# Patient Record
Sex: MALE | Race: WHITE | NOT HISPANIC OR LATINO | Employment: UNEMPLOYED | URBAN - METROPOLITAN AREA
[De-identification: names, ages, dates, MRNs, and addresses within clinical notes are randomized per-mention and may not be internally consistent; named-entity substitution may affect disease eponyms.]

---

## 2018-01-12 ENCOUNTER — HISTORICAL (OUTPATIENT)
Dept: ADMINISTRATIVE | Facility: HOSPITAL | Age: 23
End: 2018-01-12

## 2018-01-12 ENCOUNTER — HISTORICAL (OUTPATIENT)
Dept: INTERNAL MEDICINE | Facility: CLINIC | Age: 23
End: 2018-01-12

## 2018-01-12 LAB
ABS NEUT (OLG): 1.89 X10(3)/MCL (ref 2.1–9.2)
ALBUMIN SERPL-MCNC: 4 GM/DL (ref 3.4–5)
ALBUMIN/GLOB SERPL: 1 RATIO (ref 1–2)
ALP SERPL-CCNC: 102 UNIT/L (ref 45–117)
ALT SERPL-CCNC: 21 UNIT/L (ref 12–78)
AMPHET UR QL SCN: NEGATIVE
APPEARANCE, UA: CLEAR
AST SERPL-CCNC: 20 UNIT/L (ref 15–37)
BACTERIA #/AREA URNS AUTO: ABNORMAL /[HPF]
BARBITURATE SCN PRESENT UR: NEGATIVE
BASOPHILS # BLD AUTO: 0.03 X10(3)/MCL
BASOPHILS NFR BLD AUTO: 1 % (ref 0–1)
BENZODIAZ UR QL SCN: NEGATIVE
BILIRUB SERPL-MCNC: 0.3 MG/DL (ref 0.2–1)
BILIRUB UR QL STRIP: NEGATIVE
BILIRUBIN DIRECT+TOT PNL SERPL-MCNC: 0.1 MG/DL
BILIRUBIN DIRECT+TOT PNL SERPL-MCNC: 0.2 MG/DL
BUN SERPL-MCNC: 17 MG/DL (ref 7–18)
CALCIUM SERPL-MCNC: 9.4 MG/DL (ref 8.5–10.1)
CANNABINOIDS UR QL SCN: NEGATIVE
CD3+CD4+ CELLS # SPEC: 93 UNIT/L (ref 589–1505)
CD3+CD4+ CELLS NFR BLD: 4.5 % (ref 31–59)
CHLORIDE SERPL-SCNC: 102 MMOL/L (ref 98–107)
CHOLEST SERPL-MCNC: 128 MG/DL
CHOLEST/HDLC SERPL: 3 {RATIO} (ref 0–5)
CO2 SERPL-SCNC: 28 MMOL/L (ref 21–32)
COCAINE UR QL SCN: NEGATIVE
COLOR UR: NORMAL
CREAT SERPL-MCNC: 0.6 MG/DL (ref 0.6–1.3)
DEPRECATED CALCIDIOL+CALCIFEROL SERPL-MC: 14.09 NG/ML (ref 30–80)
EOSINOPHIL # BLD AUTO: 0.05 10*3/UL
EOSINOPHIL NFR BLD AUTO: 1 % (ref 0–5)
ERYTHROCYTE [DISTWIDTH] IN BLOOD BY AUTOMATED COUNT: 12.4 % (ref 11.5–14.5)
GLOBULIN SER-MCNC: 4.7 GM/ML (ref 2.3–3.5)
GLUCOSE (UA): NORMAL
GLUCOSE CSF-MCNC: 49 MG/DL (ref 40–70)
GLUCOSE SERPL-MCNC: 92 MG/DL (ref 74–106)
GRAM STN SPEC: NORMAL
HAV AB SER QL IA: NONREACTIVE
HBV CORE AB SERPL QL IA: NONREACTIVE
HBV SURFACE AB SER-ACNC: <3.1 MIU/ML
HBV SURFACE AB SERPL IA-ACNC: NONREACTIVE M[IU]/ML
HBV SURFACE AG SERPL QL IA: NEGATIVE
HCT VFR BLD AUTO: 43.8 % (ref 40–51)
HCV AB SERPL QL IA: NONREACTIVE
HDLC SERPL-MCNC: 43 MG/DL
HGB BLD-MCNC: 15 GM/DL (ref 13.5–17.5)
HGB UR QL STRIP: NEGATIVE
HYALINE CASTS #/AREA URNS LPF: ABNORMAL /[LPF]
IMM GRANULOCYTES # BLD AUTO: 0.02 10*3/UL
IMM GRANULOCYTES NFR BLD AUTO: 0 %
KETONES UR QL STRIP: NEGATIVE
LDLC SERPL CALC-MCNC: 61 MG/DL (ref 0–130)
LEUKOCYTE ESTERASE UR QL STRIP: NEGATIVE
LYMPHOCYTES # BLD AUTO: 2.07 X10(3)/MCL
LYMPHOCYTES # BLD AUTO: 2064 UNIT/L (ref 1260–5520)
LYMPHOCYTES NFR BLD AUTO: 48 % (ref 15–40)
LYMPHOCYTES NFR LN MANUAL: 48 % (ref 28–48)
LYMPHOMA - T-CELL MARKERS SPEC-IMP: ABNORMAL
MCH RBC QN AUTO: 28.1 PG (ref 26–34)
MCHC RBC AUTO-ENTMCNC: 34.2 GM/DL (ref 31–37)
MCV RBC AUTO: 82 FL (ref 80–100)
MONOCYTES # BLD AUTO: 0.28 X10(3)/MCL
MONOCYTES NFR BLD AUTO: 6 % (ref 4–12)
NEUTROPHILS # BLD AUTO: 1.89 X10(3)/MCL
NEUTROPHILS NFR BLD AUTO: 43 X10(3)/MCL
NITRITE UR QL STRIP: NEGATIVE
OPIATES UR QL SCN: NEGATIVE
PCP UR QL: NEGATIVE
PH UR STRIP.AUTO: 7.5 [PH] (ref 5–8)
PH UR STRIP: 7.5 [PH] (ref 4.5–8)
PLATELET # BLD AUTO: 324 X10(3)/MCL (ref 130–400)
PMV BLD AUTO: 9.8 FL (ref 7.4–10.4)
POTASSIUM SERPL-SCNC: 4.4 MMOL/L (ref 3.5–5.1)
PROT CSF-MCNC: 41.7 MG/DL (ref 15–45)
PROT SERPL-MCNC: 8.7 GM/DL (ref 6.4–8.2)
PROT UR QL STRIP: NEGATIVE
RBC # BLD AUTO: 5.34 X10(6)/MCL (ref 4.5–5.9)
RBC #/AREA URNS AUTO: ABNORMAL /[HPF]
RPR SER QL: REACTIVE
SODIUM SERPL-SCNC: 138 MMOL/L (ref 136–145)
SP GR UR STRIP: 1.01 (ref 1–1.03)
SQUAMOUS #/AREA URNS LPF: ABNORMAL /[LPF]
T PALLIDUM AB SER QL: REACTIVE
T-SPOT.TB: NORMAL
TEMPERATURE, URINE (OHS): 25 DEGC (ref 20–25)
TRIGL SERPL-MCNC: 122 MG/DL
TSH SERPL-ACNC: 2.97 MIU/L (ref 0.36–3.74)
UROBILINOGEN UR STRIP-ACNC: NORMAL
VLDLC SERPL CALC-MCNC: 24 MG/DL
WBC # BLD AUTO: 4300 /MM3 (ref 4500–11500)
WBC # SPEC AUTO: 4.3 X10(3)/MCL (ref 4.5–11)
WBC #/AREA URNS AUTO: ABNORMAL /HPF

## 2018-01-15 ENCOUNTER — HISTORICAL (OUTPATIENT)
Dept: ADMINISTRATIVE | Facility: HOSPITAL | Age: 23
End: 2018-01-15

## 2018-01-17 LAB — FINAL CULTURE: NORMAL

## 2018-01-25 ENCOUNTER — HISTORICAL (OUTPATIENT)
Dept: ADMINISTRATIVE | Facility: HOSPITAL | Age: 23
End: 2018-01-25

## 2018-01-25 LAB
ALBUMIN SERPL-MCNC: 3.8 GM/DL (ref 3.4–5)
ALBUMIN/GLOB SERPL: 1 RATIO (ref 1–2)
ALP SERPL-CCNC: 95 UNIT/L (ref 45–117)
ALT SERPL-CCNC: 143 UNIT/L (ref 12–78)
AST SERPL-CCNC: 61 UNIT/L (ref 15–37)
BILIRUB SERPL-MCNC: 0.3 MG/DL (ref 0.2–1)
BILIRUBIN DIRECT+TOT PNL SERPL-MCNC: 0.1 MG/DL
BILIRUBIN DIRECT+TOT PNL SERPL-MCNC: 0.2 MG/DL
BUN SERPL-MCNC: 9 MG/DL (ref 7–18)
CALCIUM SERPL-MCNC: 8.8 MG/DL (ref 8.5–10.1)
CHLORIDE SERPL-SCNC: 104 MMOL/L (ref 98–107)
CO2 SERPL-SCNC: 29 MMOL/L (ref 21–32)
CREAT SERPL-MCNC: 0.6 MG/DL (ref 0.6–1.3)
DEPRECATED CALCIDIOL+CALCIFEROL SERPL-MC: 19.37 NG/ML (ref 30–80)
GLOBULIN SER-MCNC: 4.6 GM/ML (ref 2.3–3.5)
GLUCOSE SERPL-MCNC: 68 MG/DL (ref 74–106)
POTASSIUM SERPL-SCNC: 4 MMOL/L (ref 3.5–5.1)
PROT SERPL-MCNC: 8.4 GM/DL (ref 6.4–8.2)
SODIUM SERPL-SCNC: 141 MMOL/L (ref 136–145)

## 2018-03-08 ENCOUNTER — HISTORICAL (OUTPATIENT)
Dept: ADMINISTRATIVE | Facility: HOSPITAL | Age: 23
End: 2018-03-08

## 2018-03-08 LAB
ABS NEUT (OLG): 2.37 X10(3)/MCL (ref 2.1–9.2)
ABS NEUT (OLG): 2.39 X10(3)/MCL (ref 2.1–9.2)
ALBUMIN SERPL-MCNC: 4 GM/DL (ref 3.4–5)
ALBUMIN/GLOB SERPL: 1 RATIO (ref 1–2)
ALP SERPL-CCNC: 74 UNIT/L (ref 45–117)
ALT SERPL-CCNC: 17 UNIT/L (ref 12–78)
AST SERPL-CCNC: 12 UNIT/L (ref 15–37)
BASOPHILS # BLD AUTO: 0.01 X10(3)/MCL
BASOPHILS # BLD AUTO: 0.02 X10(3)/MCL
BASOPHILS NFR BLD AUTO: 0 %
BASOPHILS NFR BLD AUTO: 0 %
BILIRUB SERPL-MCNC: 0.3 MG/DL (ref 0.2–1)
BILIRUBIN DIRECT+TOT PNL SERPL-MCNC: <0.1 MG/DL
BILIRUBIN DIRECT+TOT PNL SERPL-MCNC: ABNORMAL MG/DL
BUN SERPL-MCNC: 13 MG/DL (ref 7–18)
CALCIUM SERPL-MCNC: 8.7 MG/DL (ref 8.5–10.1)
CD3+CD4+ CELLS # SPEC: 186 UNIT/L (ref 589–1505)
CD3+CD4+ CELLS NFR BLD: 8.3 % (ref 31–59)
CHLORIDE SERPL-SCNC: 105 MMOL/L (ref 98–107)
CO2 SERPL-SCNC: 30 MMOL/L (ref 21–32)
CREAT SERPL-MCNC: 0.9 MG/DL (ref 0.6–1.3)
DEPRECATED CALCIDIOL+CALCIFEROL SERPL-MC: 50.26 NG/ML (ref 30–80)
EOSINOPHIL # BLD AUTO: 0.06 10*3/UL
EOSINOPHIL # BLD AUTO: 0.06 10*3/UL
EOSINOPHIL NFR BLD AUTO: 1 %
EOSINOPHIL NFR BLD AUTO: 1 %
ERYTHROCYTE [DISTWIDTH] IN BLOOD BY AUTOMATED COUNT: 15.3 % (ref 11.5–14.5)
ERYTHROCYTE [DISTWIDTH] IN BLOOD BY AUTOMATED COUNT: 15.4 % (ref 11.5–14.5)
GLOBULIN SER-MCNC: 3.7 GM/ML (ref 2.3–3.5)
GLUCOSE SERPL-MCNC: 73 MG/DL (ref 74–106)
HCT VFR BLD AUTO: 42.8 % (ref 40–51)
HCT VFR BLD AUTO: 43.2 % (ref 40–51)
HGB BLD-MCNC: 14.7 GM/DL (ref 13.5–17.5)
HGB BLD-MCNC: 14.7 GM/DL (ref 13.5–17.5)
IMM GRANULOCYTES # BLD AUTO: 0.01 10*3/UL
IMM GRANULOCYTES # BLD AUTO: 0.01 10*3/UL
IMM GRANULOCYTES NFR BLD AUTO: 0 %
IMM GRANULOCYTES NFR BLD AUTO: 0 %
LYMPHOCYTES # BLD AUTO: 2.09 X10(3)/MCL
LYMPHOCYTES # BLD AUTO: 2.27 X10(3)/MCL
LYMPHOCYTES # BLD AUTO: 2244 UNIT/L (ref 1260–5520)
LYMPHOCYTES NFR BLD AUTO: 42 % (ref 13–40)
LYMPHOCYTES NFR BLD AUTO: 44 % (ref 13–40)
LYMPHOCYTES NFR LN MANUAL: 44 % (ref 28–48)
LYMPHOMA - T-CELL MARKERS SPEC-IMP: ABNORMAL
MCH RBC QN AUTO: 29.2 PG (ref 26–34)
MCH RBC QN AUTO: 29.5 PG (ref 26–34)
MCHC RBC AUTO-ENTMCNC: 34 GM/DL (ref 31–37)
MCHC RBC AUTO-ENTMCNC: 34.3 GM/DL (ref 31–37)
MCV RBC AUTO: 85.7 FL (ref 80–100)
MCV RBC AUTO: 85.8 FL (ref 80–100)
MONOCYTES # BLD AUTO: 0.38 X10(3)/MCL
MONOCYTES # BLD AUTO: 0.39 X10(3)/MCL
MONOCYTES NFR BLD AUTO: 8 % (ref 4–12)
MONOCYTES NFR BLD AUTO: 8 % (ref 4–12)
NEUTROPHILS # BLD AUTO: 2.37 X10(3)/MCL
NEUTROPHILS # BLD AUTO: 2.39 X10(3)/MCL
NEUTROPHILS NFR BLD AUTO: 46 X10(3)/MCL
NEUTROPHILS NFR BLD AUTO: 48 X10(3)/MCL
PLATELET # BLD AUTO: 272 X10(3)/MCL (ref 130–400)
PLATELET # BLD AUTO: 279 X10(3)/MCL (ref 130–400)
PMV BLD AUTO: 9.4 FL (ref 7.4–10.4)
PMV BLD AUTO: 9.4 FL (ref 7.4–10.4)
POTASSIUM SERPL-SCNC: 4.1 MMOL/L (ref 3.5–5.1)
PROT SERPL-MCNC: 7.7 GM/DL (ref 6.4–8.2)
RBC # BLD AUTO: 4.99 X10(6)/MCL (ref 4.5–5.9)
RBC # BLD AUTO: 5.04 X10(6)/MCL (ref 4.5–5.9)
RPR SER QL: REACTIVE
SODIUM SERPL-SCNC: 140 MMOL/L (ref 136–145)
T PALLIDUM AB SER QL: REACTIVE
WBC # BLD AUTO: 5100 /MM3 (ref 4500–11500)
WBC # SPEC AUTO: 5 X10(3)/MCL (ref 4.5–11)
WBC # SPEC AUTO: 5.1 X10(3)/MCL (ref 4.5–11)

## 2018-05-09 ENCOUNTER — HISTORICAL (OUTPATIENT)
Dept: ADMINISTRATIVE | Facility: HOSPITAL | Age: 23
End: 2018-05-09

## 2018-05-09 LAB
ABS NEUT (OLG): 3.03 X10(3)/MCL (ref 2.1–9.2)
ALBUMIN SERPL-MCNC: 4.2 GM/DL (ref 3.4–5)
ALBUMIN/GLOB SERPL: 1 RATIO (ref 1–2)
ALP SERPL-CCNC: 70 UNIT/L (ref 45–117)
ALT SERPL-CCNC: 20 UNIT/L (ref 12–78)
AST SERPL-CCNC: 15 UNIT/L (ref 15–37)
BASOPHILS # BLD AUTO: 0.03 X10(3)/MCL
BASOPHILS NFR BLD AUTO: 0 %
BILIRUB SERPL-MCNC: 0.5 MG/DL (ref 0.2–1)
BILIRUBIN DIRECT+TOT PNL SERPL-MCNC: 0.2 MG/DL
BILIRUBIN DIRECT+TOT PNL SERPL-MCNC: 0.3 MG/DL
BUN SERPL-MCNC: 13 MG/DL (ref 7–18)
CALCIUM SERPL-MCNC: 8.4 MG/DL (ref 8.5–10.1)
CD3+CD4+ CELLS # SPEC: 194 UNIT/L (ref 589–1505)
CD3+CD4+ CELLS NFR BLD: 10.1 % (ref 31–59)
CHLORIDE SERPL-SCNC: 107 MMOL/L (ref 98–107)
CO2 SERPL-SCNC: 27 MMOL/L (ref 21–32)
CREAT SERPL-MCNC: 0.7 MG/DL (ref 0.6–1.3)
DEPRECATED CALCIDIOL+CALCIFEROL SERPL-MC: 85.65 NG/ML (ref 30–80)
EOSINOPHIL # BLD AUTO: 0.11 X10(3)/MCL
EOSINOPHIL NFR BLD AUTO: 2 %
ERYTHROCYTE [DISTWIDTH] IN BLOOD BY AUTOMATED COUNT: 12.8 % (ref 11.5–14.5)
GLOBULIN SER-MCNC: 3.5 GM/ML (ref 2.3–3.5)
GLUCOSE SERPL-MCNC: 86 MG/DL (ref 74–106)
HCT VFR BLD AUTO: 44.9 % (ref 40–51)
HGB BLD-MCNC: 15.6 GM/DL (ref 13.5–17.5)
IMM GRANULOCYTES # BLD AUTO: 0.02 10*3/UL
IMM GRANULOCYTES NFR BLD AUTO: 0 %
LYMPHOCYTES # BLD AUTO: 1.93 X10(3)/MCL
LYMPHOCYTES # BLD AUTO: 1925 UNIT/L (ref 1260–5520)
LYMPHOCYTES NFR BLD AUTO: 35 % (ref 13–40)
LYMPHOCYTES NFR LN MANUAL: 35 % (ref 28–48)
LYMPHOMA - T-CELL MARKERS SPEC-IMP: ABNORMAL
MCH RBC QN AUTO: 30.4 PG (ref 26–34)
MCHC RBC AUTO-ENTMCNC: 34.7 GM/DL (ref 31–37)
MCV RBC AUTO: 87.5 FL (ref 80–100)
MONOCYTES # BLD AUTO: 0.42 X10(3)/MCL
MONOCYTES NFR BLD AUTO: 8 % (ref 4–12)
NEUTROPHILS # BLD AUTO: 3.03 X10(3)/MCL
NEUTROPHILS NFR BLD AUTO: 55 X10(3)/MCL
PLATELET # BLD AUTO: 260 X10(3)/MCL (ref 130–400)
PMV BLD AUTO: 9.3 FL (ref 7.4–10.4)
POTASSIUM SERPL-SCNC: 3.6 MMOL/L (ref 3.5–5.1)
PROT SERPL-MCNC: 7.7 GM/DL (ref 6.4–8.2)
RBC # BLD AUTO: 5.13 X10(6)/MCL (ref 4.5–5.9)
RPR SER QL: REACTIVE
SODIUM SERPL-SCNC: 139 MMOL/L (ref 136–145)
T PALLIDUM AB SER QL: REACTIVE
WBC # BLD AUTO: 5500 /MM3 (ref 4500–11500)
WBC # SPEC AUTO: 5.5 X10(3)/MCL (ref 4.5–11)

## 2018-07-11 ENCOUNTER — HISTORICAL (OUTPATIENT)
Dept: ADMINISTRATIVE | Facility: HOSPITAL | Age: 23
End: 2018-07-11

## 2018-07-11 LAB
ABS NEUT (OLG): 3.33 X10(3)/MCL (ref 2.1–9.2)
ALBUMIN SERPL-MCNC: 4.3 GM/DL (ref 3.4–5)
ALBUMIN/GLOB SERPL: 1 RATIO (ref 1–2)
ALP SERPL-CCNC: 76 UNIT/L (ref 45–117)
ALT SERPL-CCNC: 22 UNIT/L (ref 12–78)
AST SERPL-CCNC: 14 UNIT/L (ref 15–37)
BASOPHILS # BLD AUTO: 0.02 X10(3)/MCL
BASOPHILS NFR BLD AUTO: 0 %
BILIRUB SERPL-MCNC: 0.3 MG/DL (ref 0.2–1)
BILIRUBIN DIRECT+TOT PNL SERPL-MCNC: 0.1 MG/DL
BILIRUBIN DIRECT+TOT PNL SERPL-MCNC: 0.2 MG/DL
BUN SERPL-MCNC: 17 MG/DL (ref 7–18)
CALCIUM SERPL-MCNC: 8.5 MG/DL (ref 8.5–10.1)
CD3+CD4+ CELLS # SPEC: 217 UNIT/L (ref 589–1505)
CD3+CD4+ CELLS NFR BLD: 12.5 % (ref 31–59)
CHLORIDE SERPL-SCNC: 105 MMOL/L (ref 98–107)
CO2 SERPL-SCNC: 29 MMOL/L (ref 21–32)
CREAT SERPL-MCNC: 1.1 MG/DL (ref 0.6–1.3)
EOSINOPHIL # BLD AUTO: 0.09 X10(3)/MCL
EOSINOPHIL NFR BLD AUTO: 2 %
ERYTHROCYTE [DISTWIDTH] IN BLOOD BY AUTOMATED COUNT: 12.4 % (ref 11.5–14.5)
GLOBULIN SER-MCNC: 3.4 GM/ML (ref 2.3–3.5)
GLUCOSE SERPL-MCNC: 81 MG/DL (ref 74–106)
HCT VFR BLD AUTO: 44.8 % (ref 40–51)
HGB BLD-MCNC: 15.5 GM/DL (ref 13.5–17.5)
IMM GRANULOCYTES # BLD AUTO: 0.01 10*3/UL
IMM GRANULOCYTES NFR BLD AUTO: 0 %
LYMPHOCYTES # BLD AUTO: 1.75 X10(3)/MCL
LYMPHOCYTES # BLD AUTO: 1736 UNIT/L (ref 1260–5520)
LYMPHOCYTES NFR BLD AUTO: 31 % (ref 13–40)
LYMPHOCYTES NFR LN MANUAL: 31 % (ref 28–48)
LYMPHOMA - T-CELL MARKERS SPEC-IMP: ABNORMAL
MCH RBC QN AUTO: 31.4 PG (ref 26–34)
MCHC RBC AUTO-ENTMCNC: 34.6 GM/DL (ref 31–37)
MCV RBC AUTO: 90.7 FL (ref 80–100)
MONOCYTES # BLD AUTO: 0.42 X10(3)/MCL
MONOCYTES NFR BLD AUTO: 8 % (ref 4–12)
NEUTROPHILS # BLD AUTO: 3.33 X10(3)/MCL
NEUTROPHILS NFR BLD AUTO: 59 X10(3)/MCL
PLATELET # BLD AUTO: 256 X10(3)/MCL (ref 130–400)
PMV BLD AUTO: 10 FL (ref 7.4–10.4)
POTASSIUM SERPL-SCNC: 3.9 MMOL/L (ref 3.5–5.1)
PROT SERPL-MCNC: 7.7 GM/DL (ref 6.4–8.2)
RBC # BLD AUTO: 4.94 X10(6)/MCL (ref 4.5–5.9)
SODIUM SERPL-SCNC: 141 MMOL/L (ref 136–145)
WBC # BLD AUTO: 5600 /MM3 (ref 4500–11500)
WBC # SPEC AUTO: 5.6 X10(3)/MCL (ref 4.5–11)

## 2018-10-17 ENCOUNTER — HISTORICAL (OUTPATIENT)
Dept: ADMINISTRATIVE | Facility: HOSPITAL | Age: 23
End: 2018-10-17

## 2018-10-17 LAB
ABS NEUT (OLG): 3.15 X10(3)/MCL (ref 2.1–9.2)
ALBUMIN SERPL-MCNC: 4 GM/DL (ref 3.4–5)
ALBUMIN/GLOB SERPL: 1 RATIO (ref 1–2)
ALP SERPL-CCNC: 88 UNIT/L (ref 45–117)
ALT SERPL-CCNC: 20 UNIT/L (ref 12–78)
AST SERPL-CCNC: 13 UNIT/L (ref 15–37)
BASOPHILS # BLD AUTO: 0.02 X10(3)/MCL
BASOPHILS NFR BLD AUTO: 0 %
BILIRUB SERPL-MCNC: 0.4 MG/DL (ref 0.2–1)
BILIRUBIN DIRECT+TOT PNL SERPL-MCNC: 0.1 MG/DL
BILIRUBIN DIRECT+TOT PNL SERPL-MCNC: 0.3 MG/DL
BUN SERPL-MCNC: 13 MG/DL (ref 7–18)
CALCIUM SERPL-MCNC: 8.7 MG/DL (ref 8.5–10.1)
CD3+CD4+ CELLS # SPEC: 275 UNIT/L (ref 589–1505)
CD3+CD4+ CELLS NFR BLD: 19.2 % (ref 31–59)
CHLORIDE SERPL-SCNC: 102 MMOL/L (ref 98–107)
CO2 SERPL-SCNC: 31 MMOL/L (ref 21–32)
CREAT SERPL-MCNC: 0.8 MG/DL (ref 0.6–1.3)
DEPRECATED CALCIDIOL+CALCIFEROL SERPL-MC: 57.96 NG/ML (ref 30–80)
EOSINOPHIL # BLD AUTO: 0.04 10*3/UL
EOSINOPHIL NFR BLD AUTO: 1 %
ERYTHROCYTE [DISTWIDTH] IN BLOOD BY AUTOMATED COUNT: 11.8 % (ref 11.5–14.5)
GLOBULIN SER-MCNC: 3.6 GM/ML (ref 2.3–3.5)
GLUCOSE SERPL-MCNC: 96 MG/DL (ref 74–106)
HAV IGM SERPL QL IA: NONREACTIVE
HBV CORE IGM SERPL QL IA: NONREACTIVE
HBV SURFACE AG SERPL QL IA: NEGATIVE
HCT VFR BLD AUTO: 45.7 % (ref 40–51)
HCV AB SERPL QL IA: NONREACTIVE
HGB BLD-MCNC: 15.5 GM/DL (ref 13.5–17.5)
IMM GRANULOCYTES # BLD AUTO: 0.02 10*3/UL
IMM GRANULOCYTES NFR BLD AUTO: 0 %
LYMPHOCYTES # BLD AUTO: 1.41 X10(3)/MCL
LYMPHOCYTES # BLD AUTO: 1431 UNIT/L (ref 1260–5520)
LYMPHOCYTES NFR BLD AUTO: 27 % (ref 13–40)
LYMPHOCYTES NFR LN MANUAL: 27 % (ref 28–48)
LYMPHOMA - T-CELL MARKERS SPEC-IMP: ABNORMAL
MCH RBC QN AUTO: 30.7 PG (ref 26–34)
MCHC RBC AUTO-ENTMCNC: 33.9 GM/DL (ref 31–37)
MCV RBC AUTO: 90.5 FL (ref 80–100)
MONOCYTES # BLD AUTO: 0.67 X10(3)/MCL
MONOCYTES NFR BLD AUTO: 13 % (ref 4–12)
NEUTROPHILS # BLD AUTO: 3.15 X10(3)/MCL
NEUTROPHILS NFR BLD AUTO: 59 X10(3)/MCL
PLATELET # BLD AUTO: 214 X10(3)/MCL (ref 130–400)
PMV BLD AUTO: 10.1 FL (ref 7.4–10.4)
POTASSIUM SERPL-SCNC: 3.9 MMOL/L (ref 3.5–5.1)
PROT SERPL-MCNC: 7.6 GM/DL (ref 6.4–8.2)
RBC # BLD AUTO: 5.05 X10(6)/MCL (ref 4.5–5.9)
SODIUM SERPL-SCNC: 137 MMOL/L (ref 136–145)
WBC # BLD AUTO: 5300 /MM3 (ref 4500–11500)
WBC # SPEC AUTO: 5.3 X10(3)/MCL (ref 4.5–11)

## 2019-02-06 ENCOUNTER — HISTORICAL (OUTPATIENT)
Dept: ADMINISTRATIVE | Facility: HOSPITAL | Age: 24
End: 2019-02-06

## 2019-02-06 LAB
ABS NEUT (OLG): 3.09 X10(3)/MCL (ref 2.1–9.2)
ALBUMIN SERPL-MCNC: 4.2 GM/DL (ref 3.4–5)
ALBUMIN/GLOB SERPL: 1.3 RATIO (ref 1.1–2)
ALP SERPL-CCNC: 66 UNIT/L (ref 45–117)
ALT SERPL-CCNC: 14 UNIT/L (ref 12–78)
AST SERPL-CCNC: 8 UNIT/L (ref 15–37)
BASOPHILS # BLD AUTO: 0.03 X10(3)/MCL
BASOPHILS NFR BLD AUTO: 0 %
BILIRUB SERPL-MCNC: 0.4 MG/DL (ref 0.2–1)
BILIRUBIN DIRECT+TOT PNL SERPL-MCNC: 0.1 MG/DL
BILIRUBIN DIRECT+TOT PNL SERPL-MCNC: 0.3 MG/DL
BUN SERPL-MCNC: 12 MG/DL (ref 7–18)
CALCIUM SERPL-MCNC: 8.4 MG/DL (ref 8.5–10.1)
CD3+CD4+ CELLS # SPEC: 333 UNIT/L (ref 589–1505)
CD3+CD4+ CELLS NFR BLD: 17.5 % (ref 31–59)
CHLORIDE SERPL-SCNC: 108 MMOL/L (ref 98–107)
CO2 SERPL-SCNC: 29 MMOL/L (ref 21–32)
CREAT SERPL-MCNC: 0.9 MG/DL (ref 0.6–1.3)
DEPRECATED CALCIDIOL+CALCIFEROL SERPL-MC: 51.44 NG/ML (ref 30–80)
EOSINOPHIL # BLD AUTO: 0.13 X10(3)/MCL
EOSINOPHIL NFR BLD AUTO: 2 %
ERYTHROCYTE [DISTWIDTH] IN BLOOD BY AUTOMATED COUNT: 12.7 % (ref 11.5–14.5)
GLOBULIN SER-MCNC: 3.2 GM/ML (ref 2.3–3.5)
GLUCOSE SERPL-MCNC: 97 MG/DL (ref 74–106)
HCT VFR BLD AUTO: 44 % (ref 40–51)
HGB BLD-MCNC: 15 GM/DL (ref 13.5–17.5)
IMM GRANULOCYTES # BLD AUTO: 0.01 10*3/UL
IMM GRANULOCYTES NFR BLD AUTO: 0 %
LYMPHOCYTES # BLD AUTO: 1.92 X10(3)/MCL
LYMPHOCYTES # BLD AUTO: 1904 UNIT/L (ref 1260–5520)
LYMPHOCYTES NFR BLD AUTO: 34 % (ref 13–40)
LYMPHOCYTES NFR LN MANUAL: 34 % (ref 28–48)
LYMPHOMA - T-CELL MARKERS SPEC-IMP: ABNORMAL
MCH RBC QN AUTO: 30 PG (ref 26–34)
MCHC RBC AUTO-ENTMCNC: 34.1 GM/DL (ref 31–37)
MCV RBC AUTO: 88 FL (ref 80–100)
MONOCYTES # BLD AUTO: 0.42 X10(3)/MCL
MONOCYTES NFR BLD AUTO: 8 % (ref 4–12)
NEG CONT SPOT COUNT: NORMAL
NEUTROPHILS # BLD AUTO: 3.09 X10(3)/MCL
NEUTROPHILS NFR BLD AUTO: 55 X10(3)/MCL
PANEL A SPOT COUNT: 1
PANEL B SPOT COUNT: 0
PLATELET # BLD AUTO: 269 X10(3)/MCL (ref 130–400)
PMV BLD AUTO: 9.7 FL (ref 7.4–10.4)
POS CONT SPOT COUNT: NORMAL
POTASSIUM SERPL-SCNC: 3.8 MMOL/L (ref 3.5–5.1)
PROT SERPL-MCNC: 7.4 GM/DL (ref 6.4–8.2)
RBC # BLD AUTO: 5 X10(6)/MCL (ref 4.5–5.9)
RPR SER QL: REACTIVE
SODIUM SERPL-SCNC: 137 MMOL/L (ref 136–145)
T PALLIDUM AB SER QL: REACTIVE
T-SPOT.TB: NORMAL
WBC # BLD AUTO: 5600 /MM3 (ref 4500–11500)
WBC # SPEC AUTO: 5.6 X10(3)/MCL (ref 4.5–11)

## 2019-07-24 ENCOUNTER — HISTORICAL (OUTPATIENT)
Dept: ADMINISTRATIVE | Facility: HOSPITAL | Age: 24
End: 2019-07-24

## 2019-07-24 LAB
ABS NEUT (OLG): 3.42 X10(3)/MCL (ref 2.1–9.2)
ALBUMIN SERPL-MCNC: 4.3 GM/DL (ref 3.4–5)
ALBUMIN/GLOB SERPL: 1.3 RATIO (ref 1.1–2)
ALP SERPL-CCNC: 77 UNIT/L (ref 45–117)
ALT SERPL-CCNC: 19 UNIT/L (ref 12–78)
APPEARANCE, UA: CLEAR
AST SERPL-CCNC: 10 UNIT/L (ref 15–37)
BACTERIA #/AREA URNS AUTO: ABNORMAL /[HPF]
BASOPHILS # BLD AUTO: 0.03 X10(3)/MCL
BASOPHILS NFR BLD AUTO: 0 %
BILIRUB SERPL-MCNC: 0.4 MG/DL (ref 0.2–1)
BILIRUB UR QL STRIP: NEGATIVE
BILIRUBIN DIRECT+TOT PNL SERPL-MCNC: 0.2 MG/DL
BILIRUBIN DIRECT+TOT PNL SERPL-MCNC: 0.2 MG/DL
BUN SERPL-MCNC: 15 MG/DL (ref 7–18)
CALCIUM SERPL-MCNC: 8.9 MG/DL (ref 8.5–10.1)
CD3+CD4+ CELLS # SPEC: 288 UNIT/L (ref 589–1505)
CD3+CD4+ CELLS NFR BLD: 18.7 % (ref 31–59)
CHLORIDE SERPL-SCNC: 107 MMOL/L (ref 98–107)
CHOLEST SERPL-MCNC: 182 MG/DL
CHOLEST/HDLC SERPL: 4 {RATIO} (ref 0–5)
CO2 SERPL-SCNC: 28 MMOL/L (ref 21–32)
COLOR UR: ABNORMAL
CREAT SERPL-MCNC: 0.8 MG/DL (ref 0.6–1.3)
DEPRECATED CALCIDIOL+CALCIFEROL SERPL-MC: 30.7 NG/ML (ref 30–80)
EOSINOPHIL # BLD AUTO: 0.08 10*3/UL
EOSINOPHIL NFR BLD AUTO: 2 %
ERYTHROCYTE [DISTWIDTH] IN BLOOD BY AUTOMATED COUNT: 12.6 % (ref 11.5–14.5)
GLOBULIN SER-MCNC: 3.4 GM/ML (ref 2.3–3.5)
GLUCOSE (UA): NORMAL
GLUCOSE SERPL-MCNC: 89 MG/DL (ref 74–106)
HAV IGM SERPL QL IA: NONREACTIVE
HBV CORE IGM SERPL QL IA: NONREACTIVE
HBV SURFACE AG SERPL QL IA: NEGATIVE
HCT VFR BLD AUTO: 46.2 % (ref 40–51)
HCV AB SERPL QL IA: NONREACTIVE
HDLC SERPL-MCNC: 46 MG/DL
HGB BLD-MCNC: 15.5 GM/DL (ref 13.5–17.5)
HGB UR QL STRIP: NEGATIVE
HYALINE CASTS #/AREA URNS LPF: ABNORMAL /[LPF]
IMM GRANULOCYTES # BLD AUTO: 0.02 10*3/UL
IMM GRANULOCYTES NFR BLD AUTO: 0 %
KETONES UR QL STRIP: NEGATIVE
LDLC SERPL CALC-MCNC: 123 MG/DL (ref 0–130)
LEUKOCYTE ESTERASE UR QL STRIP: NEGATIVE
LYMPHOCYTES # BLD AUTO: 1.54 X10(3)/MCL
LYMPHOCYTES # BLD AUTO: 1540 UNIT/L (ref 1260–5520)
LYMPHOCYTES NFR BLD AUTO: 28 % (ref 13–40)
LYMPHOCYTES NFR LN MANUAL: 28 % (ref 28–48)
LYMPHOMA - T-CELL MARKERS SPEC-IMP: ABNORMAL
MCH RBC QN AUTO: 29.5 PG (ref 26–34)
MCHC RBC AUTO-ENTMCNC: 33.5 GM/DL (ref 31–37)
MCV RBC AUTO: 87.8 FL (ref 80–100)
MONOCYTES # BLD AUTO: 0.42 X10(3)/MCL
MONOCYTES NFR BLD AUTO: 8 % (ref 0–24)
NEUTROPHILS # BLD AUTO: 3.42 X10(3)/MCL
NEUTROPHILS NFR BLD AUTO: 62 X10(3)/MCL
NITRITE UR QL STRIP: NEGATIVE
PH UR STRIP: 6.5 [PH] (ref 4.5–8)
PLATELET # BLD AUTO: 272 X10(3)/MCL (ref 130–400)
PMV BLD AUTO: 10 FL (ref 7.4–10.4)
POTASSIUM SERPL-SCNC: 3.8 MMOL/L (ref 3.5–5.1)
PROT SERPL-MCNC: 7.7 GM/DL (ref 6.4–8.2)
PROT UR QL STRIP: NEGATIVE
RBC # BLD AUTO: 5.26 X10(6)/MCL (ref 4.5–5.9)
RBC #/AREA URNS AUTO: ABNORMAL /[HPF]
RPR SER QL: REACTIVE
SODIUM SERPL-SCNC: 140 MMOL/L (ref 136–145)
SP GR UR STRIP: 1.02 (ref 1–1.03)
SQUAMOUS #/AREA URNS LPF: <1 /[LPF]
T PALLIDUM AB SER QL: REACTIVE
TRIGL SERPL-MCNC: 66 MG/DL
TSH SERPL-ACNC: 3.18 MIU/L (ref 0.36–3.74)
UROBILINOGEN UR STRIP-ACNC: 2 MG/DL
VLDLC SERPL CALC-MCNC: 13 MG/DL
WBC # BLD AUTO: 5500 /MM3 (ref 4500–11500)
WBC # SPEC AUTO: 5.5 X10(3)/MCL (ref 4.5–11)
WBC #/AREA URNS AUTO: ABNORMAL /HPF

## 2019-10-28 ENCOUNTER — HISTORICAL (OUTPATIENT)
Dept: ADMINISTRATIVE | Facility: HOSPITAL | Age: 24
End: 2019-10-28

## 2019-10-28 LAB
ABS NEUT (OLG): 4.2 X10(3)/MCL (ref 2.1–9.2)
ALBUMIN SERPL-MCNC: 4 GM/DL (ref 3.4–5)
ALBUMIN/GLOB SERPL: 1.2 RATIO (ref 1.1–2)
ALP SERPL-CCNC: 73 UNIT/L (ref 45–117)
ALT SERPL-CCNC: 13 UNIT/L (ref 12–78)
AST SERPL-CCNC: 8 UNIT/L (ref 15–37)
BASOPHILS # BLD AUTO: 0 X10(3)/MCL (ref 0–0.2)
BASOPHILS NFR BLD AUTO: 1 %
BILIRUB SERPL-MCNC: 0.5 MG/DL (ref 0.2–1)
BILIRUBIN DIRECT+TOT PNL SERPL-MCNC: 0.1 MG/DL (ref 0–0.2)
BILIRUBIN DIRECT+TOT PNL SERPL-MCNC: 0.4 MG/DL
BUN SERPL-MCNC: 14 MG/DL (ref 7–18)
CALCIUM SERPL-MCNC: 8.6 MG/DL (ref 8.5–10.1)
CD3+CD4+ CELLS # SPEC: 363 UNIT/L (ref 589–1505)
CD3+CD4+ CELLS NFR BLD: 21.5 % (ref 31–59)
CHLORIDE SERPL-SCNC: 109 MMOL/L (ref 98–107)
CO2 SERPL-SCNC: 28 MMOL/L (ref 21–32)
CREAT SERPL-MCNC: 0.8 MG/DL (ref 0.6–1.3)
DEPRECATED CALCIDIOL+CALCIFEROL SERPL-MC: 35.12 NG/ML (ref 30–80)
EOSINOPHIL # BLD AUTO: 0.1 X10(3)/MCL (ref 0–0.9)
EOSINOPHIL NFR BLD AUTO: 2 %
ERYTHROCYTE [DISTWIDTH] IN BLOOD BY AUTOMATED COUNT: 12.4 % (ref 11.5–14.5)
GLOBULIN SER-MCNC: 3.4 GM/ML (ref 2.3–3.5)
GLUCOSE SERPL-MCNC: 92 MG/DL (ref 74–106)
HCT VFR BLD AUTO: 48.1 % (ref 40–51)
HGB BLD-MCNC: 16.4 GM/DL (ref 13.5–17.5)
IMM GRANULOCYTES # BLD AUTO: 0.02 10*3/UL
IMM GRANULOCYTES NFR BLD AUTO: 0 %
LYMPHOCYTES # BLD AUTO: 1.7 X10(3)/MCL (ref 0.6–4.6)
LYMPHOCYTES # BLD AUTO: 1690 UNIT/L (ref 1260–5520)
LYMPHOCYTES NFR BLD AUTO: 26 %
LYMPHOCYTES NFR LN MANUAL: 26 % (ref 28–48)
LYMPHOMA - T-CELL MARKERS SPEC-IMP: ABNORMAL
MCH RBC QN AUTO: 30.9 PG (ref 26–34)
MCHC RBC AUTO-ENTMCNC: 34.1 GM/DL (ref 31–37)
MCV RBC AUTO: 90.6 FL (ref 80–100)
MONOCYTES # BLD AUTO: 0.4 X10(3)/MCL (ref 0.1–1.3)
MONOCYTES NFR BLD AUTO: 7 %
NEUTROPHILS # BLD AUTO: 4.2 X10(3)/MCL (ref 2.1–9.2)
NEUTROPHILS NFR BLD AUTO: 65 %
PLATELET # BLD AUTO: 256 X10(3)/MCL (ref 130–400)
PMV BLD AUTO: 9.9 FL (ref 7.4–10.4)
POTASSIUM SERPL-SCNC: 4 MMOL/L (ref 3.5–5.1)
PROT SERPL-MCNC: 7.4 GM/DL (ref 6.4–8.2)
RBC # BLD AUTO: 5.31 X10(6)/MCL (ref 4.5–5.9)
RPR SER QL: REACTIVE
SODIUM SERPL-SCNC: 139 MMOL/L (ref 136–145)
T PALLIDUM AB SER QL: REACTIVE
WBC # BLD AUTO: 6500 /MM3 (ref 4500–11500)
WBC # SPEC AUTO: 6.5 X10(3)/MCL (ref 4.5–11)

## 2021-04-13 LAB
ABS NEUT (OLG): 3.2 X10(3)/MCL (ref 1.5–6.9)
ALBUMIN SERPL-MCNC: 3.9 GM/DL (ref 3.5–5)
ALBUMIN/GLOB SERPL: 1 RATIO (ref 1.1–2)
ALP SERPL-CCNC: 71 UNIT/L (ref 40–150)
ALT SERPL-CCNC: 10 UNIT/L (ref 0–55)
APTT PPP: 37.2 SEC (ref 23.4–34.9)
AST SERPL-CCNC: 15 UNIT/L (ref 5–34)
BASOPHILS # BLD AUTO: 0 X10(3)/MCL (ref 0–0.1)
BASOPHILS NFR BLD AUTO: 1 % (ref 0–1)
BILIRUB SERPL-MCNC: 0.3 MG/DL
BILIRUBIN DIRECT+TOT PNL SERPL-MCNC: 0.1 MG/DL (ref 0–0.5)
BILIRUBIN DIRECT+TOT PNL SERPL-MCNC: 0.2 MG/DL (ref 0–0.8)
BUN SERPL-MCNC: 7 MG/DL (ref 8.9–20.6)
CALCIUM SERPL-MCNC: 9.1 MG/DL (ref 8.4–10.2)
CHLORIDE SERPL-SCNC: 104 MMOL/L (ref 98–107)
CO2 SERPL-SCNC: 28 MMOL/L (ref 22–29)
CREAT SERPL-MCNC: 0.89 MG/DL (ref 0.73–1.18)
EOSINOPHIL # BLD AUTO: 0.2 X10(3)/MCL (ref 0–0.6)
EOSINOPHIL NFR BLD AUTO: 4 % (ref 0–5)
ERYTHROCYTE [DISTWIDTH] IN BLOOD BY AUTOMATED COUNT: 13.9 % (ref 11.5–17)
GLOBULIN SER-MCNC: 3.8 GM/DL (ref 2.4–3.5)
GLUCOSE SERPL-MCNC: 104 MG/DL (ref 74–100)
HCT VFR BLD AUTO: 39.1 % (ref 42–52)
HGB BLD-MCNC: 13 GM/DL (ref 14–18)
IMM GRANULOCYTES # BLD AUTO: 0.01 10*3/UL (ref 0–0.02)
IMM GRANULOCYTES NFR BLD AUTO: 0.2 % (ref 0–0.43)
INR PPP: 1 (ref 2–3)
LYMPHOCYTES # BLD AUTO: 1.6 X10(3)/MCL (ref 0.5–4.1)
LYMPHOCYTES NFR BLD AUTO: 30 % (ref 15–40)
MCH RBC QN AUTO: 31 PG (ref 27–34)
MCHC RBC AUTO-ENTMCNC: 33 GM/DL (ref 31–36)
MCV RBC AUTO: 92 FL (ref 80–99)
MONOCYTES # BLD AUTO: 0.3 X10(3)/MCL (ref 0–1.1)
MONOCYTES NFR BLD AUTO: 6 % (ref 4–12)
NEUTROPHILS # BLD AUTO: 3.2 X10(3)/MCL (ref 1.5–6.9)
NEUTROPHILS NFR BLD AUTO: 60 % (ref 43–75)
PLATELET # BLD AUTO: 300 X10(3)/MCL (ref 140–400)
PMV BLD AUTO: 9.3 FL (ref 6.8–10)
POTASSIUM SERPL-SCNC: 3.8 MMOL/L (ref 3.5–5.1)
PROT SERPL-MCNC: 7.7 GM/DL (ref 6.4–8.3)
PROTHROMBIN TIME: 12.9 SEC (ref 11.7–14.5)
RBC # BLD AUTO: 4.23 X10(6)/MCL (ref 4.7–6.1)
SODIUM SERPL-SCNC: 137 MMOL/L (ref 136–145)
WBC # SPEC AUTO: 5.4 X10(3)/MCL (ref 4.5–11.5)

## 2021-04-19 ENCOUNTER — HISTORICAL (OUTPATIENT)
Dept: ANESTHESIOLOGY | Facility: HOSPITAL | Age: 26
End: 2021-04-19

## 2022-04-10 ENCOUNTER — HISTORICAL (OUTPATIENT)
Dept: ADMINISTRATIVE | Facility: HOSPITAL | Age: 27
End: 2022-04-10

## 2022-04-24 VITALS
DIASTOLIC BLOOD PRESSURE: 84 MMHG | SYSTOLIC BLOOD PRESSURE: 127 MMHG | OXYGEN SATURATION: 100 % | BODY MASS INDEX: 20.36 KG/M2 | WEIGHT: 142.19 LBS | HEIGHT: 70 IN

## 2022-05-03 NOTE — HISTORICAL [facility]ER
This is a historical note converted from Tammy. Formatting and pictures may have been removed.  Please reference [place] for original formatting and attached multimedia. Chief Complaint  new dx HIV  History of Present Illness  Edwin is a 21 yo male with history of rash to the palms and soles which started in March of 2017 who was recently tested and treated for syphilis.? Also recently diagnosed with HIV disease.  ?  Patient is a _22__ yo?male with history of HIV disease who presents to the office today for establishment of HIV care. Patient has been seen by HIV  for initial HIV intake and initial HIV labs pending at this time. ?Overall, coping with new diagnosis of HIV well, no depression, insomnia, si/hi. ?Has a good support system, and while this is news that has come as a surprise and as shocking, tells me that overall, is coping and dealing with this diagnosis one day at a time. ?Is fairly well educated at least from an introductory standpoint, about HIV disease, knows the basics about HIV virus and Tcells, that the goal of ART is to make HIV viral load undetectable and CD4 cells as high as possible subjectively. ?Treatment naive. ?  ?   Came to know was HIV positive by: ?________positive test at [facility]_______________  ?   Last negative Test: ?___________4 years ago______________  ?   Current Sexual Activity: ?______Not currently sexually active, but MSM, receptive anal intercourse without condoms frequently subjectively_____________________  ?   Social History: ?  ? ?*_X__MSM____  ? ?*IVDU ?___no_  ? ?*Blood transfusion history __no__  ? ?*Tattoos ?__no__  ? ?*Substance abuse history__no__  ? ?*Smoker_yes__  ? ?*Alcohol use___not routinely_  ? ?*Currently employed___at [employer]___  ?   Current support system:___Reports he has a wonderfully supportive family and friends and is coping with his diagnosis well.________________________________  ?   *In terms of complaints today, has none  ?    No fevers/chills/night sweats, no n/v/d/abd pain. ?No cp/sob. No itching. ?No icterus. ?Good appetite. ?Motivated for treatment and further education about this new diagnosis.??  ?  Does admit to significant visual changes over the last 3 months, feels as though Im looking through a black sponge at times  + tingling/burning/numbness in his hands and feet occasionally  ?  More concerning are his rashes to the palms and soles  Denies any difficulty swallowing  ?  ?  Review of Systems  Full 14 point ROS performed, all negative except as mentioned in HPI  Physical Exam  Vitals & Measurements  T:?36.8? ?C ?(Oral)? HR:?102?(Peripheral)? RR:?18? BP:?117/80?  HT:?178?cm? HT:?178?cm? WT:?58.5?kg? WT:?58.5?kg? BMI:?18.46?  ????PE: Gen: AAOx3 in nad, comfortable  ????HEENT: no thrush, no pharyngeal erythema  ????Neck: supple  ????CVS: RRR no m/r/g  ????C/L: CTA bilaterally  ????Abd: soft, nt/nd  ????Ext: no c/c/e  ????Skin: significant macerated maculopapular rash to the palms and soles.? Pictures taken to put in the chart  ????? Neuro:? CN 2-12 grossly intact, no focal neurologic deficits  Assessment/Plan  1.?HIV disease  ?Discussed HIV status at length to include need for 100% medication compliance and adherence and safe sex counseling performed. Patient voiced understanding to both. Will check initial HIV labs today which have been ordered. Long discussion today regarding the HIV followup process, clinic processes, the disease itself, what to expect in terms of the disease, ART (and assessed readiness for same as anticipate will start ART at next visit) as well as discussed the need for a support system, be it friends, family, or myself and my team here at [facility]. Have also had a lengthy discussion about HIV disease in years past vs. the modern day reality of HIV disease to include the plethora of medications available in terms of treatment as well as the likely available treatments on the  horizon--injectables or otherwise. All questions have been answered and have given patient a snapshot of what to expect at next appointment, in this case, plans for next appointment will be as follows: review of labs, repeat assessment of readiness to start ART and hopeful initiation of same, discussion of available ART and potential A/Es of regimen chosen, resistance mechanisms and medication adherence counseling, as well as the answering of any additional questions which may arise between now and then. Have also provided patient with the contact information for the [facility] as well as the HIV  phone number and name in the event there would be a need to get in touch with me in the mean time. Offered additional emotional support as well as encouraged the patient that despite this new diagnosis, to expect to gain control of?his HIV disease in short order and adjust to his diagnosis and daily medication regimen without difficulty. All questions answered at length. Discussed importance of scheduled followup as well.  2.?Syphilis  ?Has been treated with Bicillin LA 2.4 million units as well as been treated with PO doxycycline, repeat RPR today  Given his history of visual disturbances as well as his history of impressive paresthesias, feel it is prudent to perform LP at this time, will do so today and send for neurosyphilis workup.? Will also plan for PICC as well as 14 days of continuous PCN G infusion 24 million units daily IV via 3C Plus.?  ?  PICC placement on Monday  3.?Visual disturbance  4.?Rash  5.?High risk sexual behavior   Problem List/Past Medical History  Ongoing  Tobacco user  Historical  Medications  DOXYCYC MONO CAP 100MG, 100 mg= 1 cap(s), Oral, BID  Allergies  No Known Allergies  Social History  Alcohol - 01/12/2018  Current, Beer, Liquor, 1-2 times per month  Employment/School - 01/12/2018  Employed  Exercise - 01/12/2018  Home/Environment - 01/12/2018  Lives with  Mother.  Nutrition/Health - 01/12/2018  Regular  Sexual - 01/12/2018  Sexually active: No.  Substance Abuse - 01/12/2018  Never  Tobacco - 01/12/2018  Current every day smoker, Cigarettes  Family History  Hypertension.: Mother.  Seizure: Brother.  Lab Results  Reviewed

## 2022-05-03 NOTE — HISTORICAL [facility]
This is a historical note converted from Tammy. Formatting and pictures may have been removed.  Please reference Tammy for original formatting and attached multimedia. Chief Complaint  follow up  History of Present Illness  This is a 22 year old male with hx of recently diagnosed HIv and neurosyphilis presents to the ID clinic for a follow up visit. patient is currently on genvoya and reports 100% compliance with his medications. He comnpleted treatment for neurosyphilis with bicillin 2.4 MU X 3, last dose was on feb 14th. denies fever, chills, night sweats. states that his appetite has improved and he gained around 20 pounds since his discharge from the hospital earlier this year. does complain of?1-2 episodes of loose stools for the past 2 weeks, denies abd pain or cramps, no blood in stool. no headache/vision problems. HIV VL from 1/25/18- [id number], CD4 count from 1/12/18- 93  Review of Systems  All systems reviewed and are negative except HPI  Physical Exam  Vitals & Measurements  T:?36.4? ?C (Oral)? HR:?84(Peripheral)? BP:?111/77?  HT:?178?cm? HT:?178?cm? WT:?65.2?kg? WT:?65.2?kg? BMI:?20.58?  General: AAOx3, NAD, alert and cooperative  HEENT: PERRLA, EOMI, normal conjunctiva  Neck: no LAD, no JVD, supple, no masses or thyromegaly  CVS: S1/S2 nml, RRR, no murmurs, rubs or gallops  Resp: CTA B/L, no rhonchi, rales, or wheezing  GI: Not distended, not tender, BS+, no guarding  Skin: not jaundiced, warm, no rashes  Extremities: no peripheral edema, peripheral pulses intact  Neuro: CN II-XII grossly intact, strength and sensation symmetric and intact throughout, no focal neurological deficits  Assessment/Plan  1. HIV /AIDS  labs from jan 2018- CD4 - 93, VL [id number]  on genvoya, reports 100% compliance with meds  counselled on medication adherance and safe sex practices  labs today to include cbc, cmp, HIV VL, CD4  on bactrim for OI prophylaxis  ?  2. Hx of neurosyphilis  treated with 3 doses of bicillin 2.4 MU  willl  check RPR titres today  ?  3. Vitamin D def  on ergocalciferol 50,000 units.  will check vit d level today.  ?  RTC in 6 weeks, labs today. advised him to call the clinic if he has >3 episodes of diarrhea daily.  ?  3.   Problem List/Past Medical History  Ongoing  Tobacco user  Historical  No qualifying data  Procedure/Surgical History  Insertion of Infusion Device into Superior Vena Cava, Percutaneous Approach (01/15/2018), Insertion of peripherally inserted central venous catheter (PICC), without subcutaneous port or pump; age 5 years or older (01/15/2018), Ultrasonography of Superior Vena Cava, Guidance (01/15/2018).  Medications  Bactrim  mg-160 mg oral tablet, 1 tab(s), Oral, Daily, 2 refills  DOXYCYC MONO CAP 100MG, 100 mg= 1 cap(s), Oral, BID,? ?Not taking  Genvoya oral tablet, 1 tab(s), Oral, Daily, 2 refills  lidocaine 1% injectable solution, 5 mL, Subcutaneous, Once  Vitamin D 50,000 intl units oral capsule, 96163 IntUnit= 1 cap(s), Oral, qWeek, 2 refills  Allergies  No Known Allergies  Social History  Alcohol  Current, Beer, Liquor, 1-2 times per month, 01/12/2018  Employment/School  Employed, 01/12/2018  Exercise  Home/Environment  Lives with Mother., 01/12/2018  Nutrition/Health  Regular, 01/12/2018  Sexual  Sexually active: No., 01/12/2018  Substance Abuse  Past, 01/25/2018  Tobacco  Former smoker, Cigarettes, 03/08/2018  Family History  Hypertension.: Mother.  Seizure: Brother.  Immunizations  Vaccine Date Status   pneumococcal 23-polyvalent vaccine 01/12/2018 Given   influenza virus vaccine, inactivated 01/12/2018 Given   tetanus/diphtheria/pertussis, acel(Tdap) 11/20/2007 Recorded   hepatitis B vaccine 02/07/1994 Recorded   hepatitis B vaccine 1993 Recorded   hepatitis B vaccine 1993 Recorded       Patient seen and examined with resident. Agree with documented physical exam. Treatment plan reviewed and discussed with resident and is reasonable and appropriate.  ?  Overall much  improved.? Labs today.? Refilled genvoya.  Discussed HIV status at length to include need for 100% medication compliance and adherence and safe sex counseling performed.? Patient voiced understanding to both.? Will check labs today to include cd4, viral load cbc and cmp.? Discussed importance of scheduled followup as well.

## 2022-05-03 NOTE — HISTORICAL [facility] CERN[facility]
This is a historical note converted from Tammy. Formatting and pictures may have been removed.  Please reference Tammy for original formatting and attached multimedia. Chief Complaint  FU AND TEST RESULTS  History of Present Illness  Edwin is a 23 yo male with history of recent diagnosis of HIV disease and neurosyphilis who is currently on therapy for same who presents to the office today without complaint for scheduled followup  Feels well overall  No n/v/d/abd pain  No new rashes, rash to the bilateral palms improved  No fevers/chills/night sweats  No ha/changes in vision  ?  Overall feels excellent and is coping with his diagnosis well and is prepared to start medications at this time  ?  ?  Review of Systems  Full 14 point ROS performed, all negative except as mentioned in HPI  Physical Exam  Vitals & Measurements  T:?36.6? ?C ?(Oral)? HR:?92?(Peripheral)? BP:?110/75?  HT:?178?cm? HT:?178?cm? WT:?59.2?kg? WT:?59.2?kg? BMI:?18.68?  ????PE: Gen: AAOx3 in nad, comfortable  ????HEENT: no thrush  ????Neck: supple  ????CVS: RRR no m/r/g  ????C/L: CTA bilaterally  ????Abd: soft, nt/nd  ????Ext: no c/c/e, PICC to LUE  ????Neuro:? no focal neurologic deficits  Assessment/Plan  HIV disease  ?Discussed HIV status at length to include need for 100% medication compliance and adherence and safe sex counseling performed.? Patient voiced understanding to both.? Will check labs today to include cd4, viral load cbc and cmp.? Discussed importance of scheduled followup as well.? Start Genvoya, bactrim and vitamin d  Ordered:  cobicistat/elvitegravir/emtricitabine/tenofov, 1 tab(s), Oral, Daily, with food, # 30 tab(s), 2 Refill(s)  ergocalciferol, 50,000 IntUnit = 1 cap(s), Oral, qWeek, # 5 cap(s), 2 Refill(s)  sulfamethoxazole-trimethoprim, 1 tab(s), Oral, Daily, # 30 tab(s), 2 Refill(s)  Clinic Follow up, *Est. 03/08/18 3:00:00 CST, Order for future visit, HIV disease  Neurosyphilis, [facility]  Panel, Routine collect, 01/25/18 14:05:00 CST, Blood, Stop date 01/25/18 14:05:00 CST, Lab Collect, Venous Draw, HIV disease  Neurosyphilis, Sometime Before, 01/25/18 13:52:00 CST  HIV-1 RNA Qnt By Real-Time PCR-[facility] [id number], Routine collect, 01/25/18 14:05:00 CST, Blood, Stop date 01/25/18 14:05:00 CST, Lab Collect, Venous Draw, HIV disease  Neurosyphilis, Sometime Before, 01/25/18 13:52:00 CST  Office/Outpatient Visit Level 4 Established 847[address] PC, HIV disease  Neurosyphilis, [facility], 01/25/18 14:08:00 CST  Vitamin D, 25-Hydroxy Level, Routine collect, 01/25/18 14:05:00 CST, Blood, Stop date 01/25/18 14:05:00 CST, Lab Collect, Venous Draw, HIV disease  Neurosyphilis, Sometime Before, 01/25/18 13:51:00 CST  ?  Neurosyphilis  ?continue therapy.  ?Bicillin 2.4 weekly x 3 weeks after finishes therapy  Ordered:  cobicistat/elvitegravir/emtricitabine/tenofov, 1 tab(s), Oral, Daily, with food, # 30 tab(s), 2 Refill(s)  ergocalciferol, 50,000 IntUnit = 1 cap(s), Oral, qWeek, # 5 cap(s), 2 Refill(s)  sulfamethoxazole-trimethoprim, 1 tab(s), Oral, Daily, # 30 tab(s), 2 Refill(s)  Clinic Follow up, *Est. 03/08/18 3:00:00 CST, Order for future visit, HIV disease  Neurosyphilis, [facility]  Comprehensive Metabolic Panel, Routine collect, 01/25/18 14:05:00 CST, Blood, Stop date 01/25/18 14:05:00 CST, Lab Collect, Venous Draw, HIV disease  Neurosyphilis, Sometime Before, 01/25/18 13:52:00 CST  HIV-1 RNA Qnt By Real-Time PCR-[facility] [id number], Routine collect, 01/25/18 14:05:00 CST, Blood, Stop date 01/25/18 14:05:00 CST, Lab Collect, Venous Draw, HIV disease  Neurosyphilis, Sometime Before, 01/25/18 13:52:00 CST  Office/Outpatient Visit Level 4 Established [id number] , HIV disease  Neurosyphilis, [facility], 01/25/18 14:08:00 CST  Vitamin D, 25-Hydroxy Level, Routine collect, 01/25/18 14:05:00 CST, Blood, Stop date 01/25/18 14:05:00 CST, Lab Collect, Venous Draw, HIV disease  Neurosyphilis, Sometime Before,  01/25/18 13:51:00 CST  ?   Problem List/Past Medical History  Ongoing  Tobacco user  Historical  Procedure/Surgical History  Insertion of Infusion Device into Superior Vena Cava, Percutaneous Approach (01/15/2018)  Insertion of peripherally inserted central venous catheter (PICC), without subcutaneous port or pump; age 5 years or older (01/15/2018)  Ultrasonography of Superior Vena Cava, Guidance (01/15/2018)  Medications  Bactrim  mg-160 mg oral tablet, 1 tab(s), Oral, Daily, 2 refills  DOXYCYC MONO CAP 100MG, 100 mg= 1 cap(s), Oral, BID,? ?Not taking  Genvoya oral tablet, 1 tab(s), Oral, Daily, 2 refills  lidocaine 1% injectable solution, 5 mL, Subcutaneous, Once  Vitamin D 50,000 intl units oral capsule, 60412 IntUnit= 1 cap(s), Oral, qWeek, 2 refills  Allergies  No Known Allergies  Social History  Alcohol - 01/25/2018  Current, Beer, Liquor, 1-2 times per month  Employment/School - 01/25/2018  Employed  Exercise - 01/25/2018  Home/Environment - 01/25/2018  Lives with Mother.  Nutrition/Health - 01/25/2018  Regular  Sexual - 01/25/2018  Sexually active: No.  Substance Abuse - 01/25/2018  Past  Tobacco - 01/25/2018  Current every day smoker, Cigarettes  Family History  Hypertension.: Mother.  Seizure: Brother.  Lab Results  Reviewed

## 2024-03-16 ENCOUNTER — HOSPITAL ENCOUNTER (EMERGENCY)
Facility: HOSPITAL | Age: 29
Discharge: PSYCHIATRIC OR SUBSTANCE USE FACILITY | End: 2024-03-16
Attending: EMERGENCY MEDICINE
Payer: MEDICAID

## 2024-03-16 VITALS
OXYGEN SATURATION: 99 % | TEMPERATURE: 98 F | RESPIRATION RATE: 18 BRPM | HEART RATE: 99 BPM | BODY MASS INDEX: 22.09 KG/M2 | DIASTOLIC BLOOD PRESSURE: 92 MMHG | WEIGHT: 154.31 LBS | HEIGHT: 70 IN | SYSTOLIC BLOOD PRESSURE: 124 MMHG

## 2024-03-16 DIAGNOSIS — B20 HIV INFECTION, UNSPECIFIED SYMPTOM STATUS: ICD-10-CM

## 2024-03-16 DIAGNOSIS — R45.851 DEPRESSION WITH SUICIDAL IDEATION: ICD-10-CM

## 2024-03-16 DIAGNOSIS — F32.A DEPRESSION WITH SUICIDAL IDEATION: ICD-10-CM

## 2024-03-16 DIAGNOSIS — F15.10 METHAMPHETAMINE ABUSE: ICD-10-CM

## 2024-03-16 DIAGNOSIS — Z00.8 MEDICAL CLEARANCE FOR PSYCHIATRIC ADMISSION: Primary | ICD-10-CM

## 2024-03-16 LAB
ALBUMIN SERPL-MCNC: 4.5 G/DL (ref 3.5–5)
ALBUMIN/GLOB SERPL: 1.5 RATIO (ref 1.1–2)
ALP SERPL-CCNC: 67 UNIT/L (ref 40–150)
ALT SERPL-CCNC: 11 UNIT/L (ref 0–55)
AMPHET UR QL SCN: POSITIVE
APAP SERPL-MCNC: <17.4 UG/ML (ref 17.4–30)
APPEARANCE UR: CLEAR
AST SERPL-CCNC: 15 UNIT/L (ref 5–34)
BACTERIA #/AREA URNS AUTO: ABNORMAL /HPF
BARBITURATE SCN PRESENT UR: NEGATIVE
BASOPHILS # BLD AUTO: 0.03 X10(3)/MCL
BASOPHILS NFR BLD AUTO: 0.4 %
BENZODIAZ UR QL SCN: NEGATIVE
BILIRUB SERPL-MCNC: 1 MG/DL
BILIRUB UR QL STRIP.AUTO: NEGATIVE
BUN SERPL-MCNC: 11 MG/DL (ref 8.9–20.6)
CALCIUM SERPL-MCNC: 9.6 MG/DL (ref 8.4–10.2)
CANNABINOIDS UR QL SCN: NEGATIVE
CHLORIDE SERPL-SCNC: 106 MMOL/L (ref 98–107)
CO2 SERPL-SCNC: 21 MMOL/L (ref 22–29)
COCAINE UR QL SCN: NEGATIVE
COLOR UR AUTO: YELLOW
CREAT SERPL-MCNC: 0.84 MG/DL (ref 0.73–1.18)
EOSINOPHIL # BLD AUTO: 0.01 X10(3)/MCL (ref 0–0.9)
EOSINOPHIL NFR BLD AUTO: 0.1 %
ERYTHROCYTE [DISTWIDTH] IN BLOOD BY AUTOMATED COUNT: 12.3 % (ref 11.5–17)
ETHANOL SERPL-MCNC: <10 MG/DL
FENTANYL UR QL SCN: NEGATIVE
GFR SERPLBLD CREATININE-BSD FMLA CKD-EPI: >60 MLS/MIN/1.73/M2
GLOBULIN SER-MCNC: 3 GM/DL (ref 2.4–3.5)
GLUCOSE SERPL-MCNC: 99 MG/DL (ref 74–100)
GLUCOSE UR QL STRIP.AUTO: NORMAL
HCT VFR BLD AUTO: 45.3 % (ref 42–52)
HGB BLD-MCNC: 15.9 G/DL (ref 14–18)
HOLD SPECIMEN: NORMAL
HYALINE CASTS #/AREA URNS LPF: ABNORMAL /LPF
IMM GRANULOCYTES # BLD AUTO: 0.02 X10(3)/MCL (ref 0–0.04)
IMM GRANULOCYTES NFR BLD AUTO: 0.2 %
KETONES UR QL STRIP.AUTO: ABNORMAL
LEUKOCYTE ESTERASE UR QL STRIP.AUTO: NEGATIVE
LYMPHOCYTES # BLD AUTO: 1.57 X10(3)/MCL (ref 0.6–4.6)
LYMPHOCYTES NFR BLD AUTO: 19.1 %
MCH RBC QN AUTO: 30.7 PG (ref 27–31)
MCHC RBC AUTO-ENTMCNC: 35.1 G/DL (ref 33–36)
MCV RBC AUTO: 87.5 FL (ref 80–94)
MDMA UR QL SCN: NEGATIVE
MONOCYTES # BLD AUTO: 0.64 X10(3)/MCL (ref 0.1–1.3)
MONOCYTES NFR BLD AUTO: 7.8 %
MUCOUS THREADS URNS QL MICRO: ABNORMAL /LPF
NEUTROPHILS # BLD AUTO: 5.94 X10(3)/MCL (ref 2.1–9.2)
NEUTROPHILS NFR BLD AUTO: 72.4 %
NITRITE UR QL STRIP.AUTO: NEGATIVE
NRBC BLD AUTO-RTO: 0 %
OPIATES UR QL SCN: NEGATIVE
PCP UR QL: NEGATIVE
PH UR STRIP.AUTO: 6 [PH]
PH UR: 6 [PH] (ref 3–11)
PLATELET # BLD AUTO: 320 X10(3)/MCL (ref 130–400)
PMV BLD AUTO: 10.1 FL (ref 7.4–10.4)
POTASSIUM SERPL-SCNC: 3.3 MMOL/L (ref 3.5–5.1)
PROT SERPL-MCNC: 7.5 GM/DL (ref 6.4–8.3)
PROT UR QL STRIP.AUTO: ABNORMAL
RBC # BLD AUTO: 5.18 X10(6)/MCL (ref 4.7–6.1)
RBC #/AREA URNS AUTO: ABNORMAL /HPF
RBC UR QL AUTO: NEGATIVE
SALICYLATES SERPL-MCNC: <5 MG/DL (ref 15–30)
SARS-COV-2 RDRP RESP QL NAA+PROBE: NEGATIVE
SODIUM SERPL-SCNC: 138 MMOL/L (ref 136–145)
SP GR UR STRIP.AUTO: 1.02 (ref 1–1.03)
SPECIFIC GRAVITY, URINE AUTO (.000) (OHS): 1.02 (ref 1–1.03)
SQUAMOUS #/AREA URNS LPF: ABNORMAL /HPF
TSH SERPL-ACNC: 0.83 UIU/ML (ref 0.35–4.94)
UROBILINOGEN UR STRIP-ACNC: NORMAL
WBC # SPEC AUTO: 8.21 X10(3)/MCL (ref 4.5–11.5)
WBC #/AREA URNS AUTO: ABNORMAL /HPF

## 2024-03-16 PROCEDURE — 85025 COMPLETE CBC W/AUTO DIFF WBC: CPT | Performed by: EMERGENCY MEDICINE

## 2024-03-16 PROCEDURE — 80307 DRUG TEST PRSMV CHEM ANLYZR: CPT | Performed by: EMERGENCY MEDICINE

## 2024-03-16 PROCEDURE — 80053 COMPREHEN METABOLIC PANEL: CPT | Performed by: EMERGENCY MEDICINE

## 2024-03-16 PROCEDURE — 82077 ASSAY SPEC XCP UR&BREATH IA: CPT | Performed by: EMERGENCY MEDICINE

## 2024-03-16 PROCEDURE — 81001 URINALYSIS AUTO W/SCOPE: CPT | Mod: XB | Performed by: EMERGENCY MEDICINE

## 2024-03-16 PROCEDURE — 80143 DRUG ASSAY ACETAMINOPHEN: CPT | Performed by: EMERGENCY MEDICINE

## 2024-03-16 PROCEDURE — 87635 SARS-COV-2 COVID-19 AMP PRB: CPT | Performed by: EMERGENCY MEDICINE

## 2024-03-16 PROCEDURE — 80179 DRUG ASSAY SALICYLATE: CPT | Performed by: EMERGENCY MEDICINE

## 2024-03-16 PROCEDURE — 84443 ASSAY THYROID STIM HORMONE: CPT | Performed by: EMERGENCY MEDICINE

## 2024-03-16 PROCEDURE — 99285 EMERGENCY DEPT VISIT HI MDM: CPT

## 2024-03-16 NOTE — ED PROVIDER NOTES
Encounter Date: 3/16/2024       History     Chief Complaint   Patient presents with    Psychiatric Evaluation     C/o depression and SI. States doesn't really care what happens. Was in rehab facility when started to write suicidal note.      History of HIV reported well controlled, most recent CD4 count and viral load at target, reports compliant with medications.  History of depression and methamphetamine abuse.  Works at a local assisted care facility, went to an outpatient substance abuse treatment program today and referred here for suicidal ideation.  States tired of everything and does not really care what happens.  Most recent meth use this morning.  No physical complaints.    The history is provided by the patient. No  was used.     Review of patient's allergies indicates:  No Known Allergies  Past Medical History:   Diagnosis Date    Depression     Immune deficiency disorder      History reviewed. No pertinent surgical history.  History reviewed. No pertinent family history.  Social History     Tobacco Use    Smoking status: Never    Smokeless tobacco: Never   Substance Use Topics    Alcohol use: Not Currently    Drug use: Not Currently     Review of Systems   Constitutional:  Negative for chills and fever.   HENT:  Negative for congestion, facial swelling, nosebleeds and sinus pressure.    Eyes:  Negative for pain and redness.   Respiratory:  Negative for chest tightness, shortness of breath and wheezing.    Cardiovascular:  Negative for chest pain, palpitations and leg swelling.   Gastrointestinal:  Negative for abdominal distention, abdominal pain, diarrhea, nausea and vomiting.   Endocrine: Negative for cold intolerance, polydipsia and polyphagia.   Genitourinary:  Negative for difficulty urinating, dysuria, frequency and hematuria.   Musculoskeletal:  Negative for arthralgias, back pain, myalgias and neck pain.   Skin:  Negative for color change and rash.   Neurological:  Negative  for dizziness, weakness, numbness and headaches.   Hematological:  Negative for adenopathy. Does not bruise/bleed easily.   Psychiatric/Behavioral:  Positive for dysphoric mood and suicidal ideas. Negative for agitation and behavioral problems.    All other systems reviewed and are negative.      Physical Exam     Initial Vitals [03/16/24 1844]   BP Pulse Resp Temp SpO2   (!) 146/96 88 20 97.9 °F (36.6 °C) 98 %      MAP       --         Physical Exam    Nursing note and vitals reviewed.  Constitutional: He appears well-developed and well-nourished. He is not diaphoretic. No distress.   HENT:   Head: Normocephalic and atraumatic.   Mouth/Throat: Oropharynx is clear and moist. No oropharyngeal exudate.   Eyes: Conjunctivae and EOM are normal. Pupils are equal, round, and reactive to light. Right eye exhibits no discharge. Left eye exhibits no discharge. No scleral icterus.   Neck: Neck supple. No thyromegaly present. No tracheal deviation present. No JVD present.   Normal range of motion.  Cardiovascular:  Normal rate, regular rhythm and normal heart sounds.     Exam reveals no gallop and no friction rub.       No murmur heard.  Pulmonary/Chest: Breath sounds normal. No respiratory distress. He has no wheezes. He has no rhonchi. He has no rales. He exhibits no tenderness.   Abdominal: Abdomen is soft. Bowel sounds are normal. He exhibits no distension and no mass. There is no abdominal tenderness. There is no rebound and no guarding.   Musculoskeletal:         General: No tenderness or edema. Normal range of motion.      Cervical back: Normal range of motion and neck supple.     Lymphadenopathy:     He has no cervical adenopathy.   Neurological: He is alert and oriented to person, place, and time. He has normal strength. No cranial nerve deficit.   Skin: Skin is warm and dry. No rash noted. No erythema.   Psychiatric:   A&O x4, flat, depressed, suicidal without specific plan, poor insight and judgment with respect to  self-care and drug abuse, memory appears intact.  No evidence of homicidal ideation, delusion, hallucination.  Judged gravely disabled.         ED Course   Procedures  Labs Reviewed   COMPREHENSIVE METABOLIC PANEL - Abnormal; Notable for the following components:       Result Value    Potassium Level 3.3 (*)     Carbon Dioxide 21 (*)     All other components within normal limits   URINALYSIS, REFLEX TO URINE CULTURE - Abnormal; Notable for the following components:    Protein, UA Trace (*)     Ketones, UA 2+ (*)     Bacteria, UA Occ (*)     Squamous Epithelial Cells, UA Trace (*)     Mucous, UA Occ (*)     All other components within normal limits   DRUG SCREEN, URINE (BEAKER) - Abnormal; Notable for the following components:    Amphetamines, Urine Positive (*)     All other components within normal limits    Narrative:     Cut off concentrations:    Amphetamines - 1000 ng/ml  Barbiturates - 200 ng/ml  Benzodiazepine - 200 ng/ml  Cannabinoids (THC) - 50 ng/ml  Cocaine - 300 ng/ml  Fentanyl - 1.0 ng/ml  MDMA - 500 ng/ml  Opiates - 300 ng/ml   Phencyclidine (PCP) - 25 ng/ml    Specimen submitted for drug analysis and tested for pH and specific gravity in order to evaluate sample integrity. Suspect tampering if specific gravity is <1.003 and/or pH is not within the range of 4.5 - 8.0  False negatives may result form substances such as bleach added to urine.  False positives may result for the presence of a substance with similar chemical structure to the drug or its metabolite.    This test provides only a PRELIMINARY analytical test result. A more specific alternate chemical method must be used in order to obtain a confirmed analytical result. Gas chromatography/mass spectrometry (GC/MS) is the preferred confirmatory method. Other chemical confirmation methods are available. Clinical consideration and professional judgement should be applied to any drug of abuse test result, particularly when preliminary positive  results are used.    Positive results will be confirmed only at the physicians request. Unconfirmed screening results are to be used only for medical purposes (treatment).        ACETAMINOPHEN LEVEL - Abnormal; Notable for the following components:    Acetaminophen Level <17.4 (*)     All other components within normal limits   SALICYLATE LEVEL - Abnormal; Notable for the following components:    Salicylate Level <5.0 (*)     All other components within normal limits   TSH - Normal   ALCOHOL,MEDICAL (ETHANOL) - Normal   SARS-COV-2 RNA AMPLIFICATION, QUAL - Normal    Narrative:     The [employer]NOW COVID-19 assay is a rapid molecular in vitro diagnostic test utilizing an isothermal nucleic acid amplification technology intended for the qualitative detection of nucleic acid from the SARS-CoV-2 viral RNA in direct nasal, nasopharyngeal or throat swabs from individuals who are suspected of COVID-19 by their healthcare provider.   CBC W/ AUTO DIFFERENTIAL    Narrative:     The following orders were created for panel order CBC auto differential.  Procedure                               Abnormality         Status                     ---------                               -----------         ------                     CBC with Differential[[id number]]                           Final result                 Please view results for these tests on the individual orders.   CBC WITH DIFFERENTIAL   EXTRA TUBES    Narrative:     The following orders were created for panel order EXTRA TUBES.  Procedure                               Abnormality         Status                     ---------                               -----------         ------                     Gold Top Hold[[id number]]                                   Final result                 Please view results for these tests on the individual orders.   GOLD TOP HOLD          Imaging Results    None          Medications - No data to display  Medical Decision Making  Problems  Addressed:  Depression with suicidal ideation: chronic illness or injury with exacerbation, progression, or side effects of treatment    Amount and/or Complexity of Data Reviewed  Labs: ordered. Decision-making details documented in ED Course.    Risk  Decision regarding hospitalization.      Additional MDM:   Differential Diagnosis:   Depression, suicidal ideation, substance abuse, other psychiatric illness, medication noncompliance                Medically cleared for psychiatry placement: 3/16/2024  6:56 PM                   Clinical Impression:  Final diagnoses:  [Z00.8] Medical clearance for psychiatric admission (Primary)  [F32.A, R45.851] Depression with suicidal ideation  [F15.10] Methamphetamine abuse  [B20] HIV infection, unspecified symptom status          ED Disposition Condition    Transfer to Psych Facility Stable          ED Prescriptions    None       Follow-up Information    None          Tevin George MD  03/16/24 1582       Tevin George MD  03/16/24 2226       Tevin George MD  03/16/24 2222

## 2024-10-19 ENCOUNTER — HOSPITAL ENCOUNTER (EMERGENCY)
Facility: HOSPITAL | Age: 29
Discharge: HOME | End: 2024-10-19
Attending: INTERNAL MEDICINE
Payer: MEDICAID

## 2024-10-19 VITALS
BODY MASS INDEX: 24.93 KG/M2 | HEIGHT: 69 IN | OXYGEN SATURATION: 100 % | DIASTOLIC BLOOD PRESSURE: 68 MMHG | SYSTOLIC BLOOD PRESSURE: 102 MMHG | WEIGHT: 168.31 LBS | HEART RATE: 65 BPM | RESPIRATION RATE: 16 BRPM | TEMPERATURE: 97 F

## 2024-10-19 DIAGNOSIS — S02.32XA: ICD-10-CM

## 2024-10-19 DIAGNOSIS — S01.81XA FACIAL LACERATION, INITIAL ENCOUNTER: ICD-10-CM

## 2024-10-19 DIAGNOSIS — R55 SYNCOPE: ICD-10-CM

## 2024-10-19 DIAGNOSIS — F19.10 POLYSUBSTANCE ABUSE: Primary | ICD-10-CM

## 2024-10-19 DIAGNOSIS — Z21 HIV INFECTION, UNSPECIFIED SYMPTOM STATUS: ICD-10-CM

## 2024-10-19 DIAGNOSIS — B20 CURRENTLY ASYMPTOMATIC HIV INFECTION, WITH HISTORY OF HIV-RELATED ILLNESS: ICD-10-CM

## 2024-10-19 DIAGNOSIS — R55 SYNCOPE, UNSPECIFIED SYNCOPE TYPE: ICD-10-CM

## 2024-10-19 LAB
ALBUMIN SERPL-MCNC: 4.4 G/DL (ref 3.5–5)
ALBUMIN/GLOB SERPL: 1.4 RATIO (ref 1.1–2)
ALP SERPL-CCNC: 70 UNIT/L (ref 40–150)
ALT SERPL-CCNC: 19 UNIT/L (ref 0–55)
AMPHET UR QL SCN: POSITIVE
ANION GAP SERPL CALC-SCNC: 8 MEQ/L
AST SERPL-CCNC: 21 UNIT/L (ref 5–34)
BARBITURATE SCN PRESENT UR: NEGATIVE
BASOPHILS # BLD AUTO: 0.03 X10(3)/MCL
BASOPHILS NFR BLD AUTO: 0.3 %
BENZODIAZ UR QL SCN: NEGATIVE
BILIRUB SERPL-MCNC: 1 MG/DL
BUN SERPL-MCNC: 15.7 MG/DL (ref 8.9–20.6)
CALCIUM SERPL-MCNC: 9.9 MG/DL (ref 8.4–10.2)
CANNABINOIDS UR QL SCN: NEGATIVE
CHLORIDE SERPL-SCNC: 104 MMOL/L (ref 98–107)
CO2 SERPL-SCNC: 26 MMOL/L (ref 22–29)
COCAINE UR QL SCN: NEGATIVE
CREAT SERPL-MCNC: 1.24 MG/DL (ref 0.72–1.25)
CREAT/UREA NIT SERPL: 13
EOSINOPHIL # BLD AUTO: 0.06 X10(3)/MCL (ref 0–0.9)
EOSINOPHIL NFR BLD AUTO: 0.5 %
ERYTHROCYTE [DISTWIDTH] IN BLOOD BY AUTOMATED COUNT: 11.9 % (ref 11.5–17)
ETHANOL SERPL-MCNC: <10 MG/DL
FENTANYL UR QL SCN: NEGATIVE
GFR SERPLBLD CREATININE-BSD FMLA CKD-EPI: >60 ML/MIN/1.73/M2
GLOBULIN SER-MCNC: 3.1 GM/DL (ref 2.4–3.5)
GLUCOSE SERPL-MCNC: 88 MG/DL (ref 74–100)
HCT VFR BLD AUTO: 47.7 % (ref 42–52)
HGB BLD-MCNC: 16.8 G/DL (ref 14–18)
HOLD SPECIMEN: NORMAL
IMM GRANULOCYTES # BLD AUTO: 0.04 X10(3)/MCL (ref 0–0.04)
IMM GRANULOCYTES NFR BLD AUTO: 0.4 %
LYMPHOCYTES # BLD AUTO: 1.75 X10(3)/MCL (ref 0.6–4.6)
LYMPHOCYTES NFR BLD AUTO: 15.6 %
MCH RBC QN AUTO: 31.2 PG (ref 27–31)
MCHC RBC AUTO-ENTMCNC: 35.2 G/DL (ref 33–36)
MCV RBC AUTO: 88.5 FL (ref 80–94)
MDMA UR QL SCN: NEGATIVE
MONOCYTES # BLD AUTO: 0.92 X10(3)/MCL (ref 0.1–1.3)
MONOCYTES NFR BLD AUTO: 8.2 %
NEUTROPHILS # BLD AUTO: 8.41 X10(3)/MCL (ref 2.1–9.2)
NEUTROPHILS NFR BLD AUTO: 75 %
NRBC BLD AUTO-RTO: 0 %
OPIATES UR QL SCN: NEGATIVE
PCP UR QL: NEGATIVE
PH UR: 5.5 [PH] (ref 3–11)
PLATELET # BLD AUTO: 343 X10(3)/MCL (ref 130–400)
PMV BLD AUTO: 9.9 FL (ref 7.4–10.4)
POTASSIUM SERPL-SCNC: 4.3 MMOL/L (ref 3.5–5.1)
PROT SERPL-MCNC: 7.5 GM/DL (ref 6.4–8.3)
RBC # BLD AUTO: 5.39 X10(6)/MCL (ref 4.7–6.1)
SODIUM SERPL-SCNC: 138 MMOL/L (ref 136–145)
SPECIFIC GRAVITY, URINE AUTO (.000) (OHS): 1.03 (ref 1–1.03)
WBC # BLD AUTO: 11.21 X10(3)/MCL (ref 4.5–11.5)

## 2024-10-19 PROCEDURE — 93005 ELECTROCARDIOGRAM TRACING: CPT

## 2024-10-19 PROCEDURE — 82077 ASSAY SPEC XCP UR&BREATH IA: CPT | Performed by: INTERNAL MEDICINE

## 2024-10-19 PROCEDURE — 80307 DRUG TEST PRSMV CHEM ANLYZR: CPT | Performed by: INTERNAL MEDICINE

## 2024-10-19 PROCEDURE — 99285 EMERGENCY DEPT VISIT HI MDM: CPT | Mod: 25

## 2024-10-19 PROCEDURE — 25000003 PHARM REV CODE 250: Performed by: EMERGENCY MEDICINE

## 2024-10-19 PROCEDURE — 80053 COMPREHEN METABOLIC PANEL: CPT | Performed by: INTERNAL MEDICINE

## 2024-10-19 PROCEDURE — 85025 COMPLETE CBC W/AUTO DIFF WBC: CPT | Performed by: INTERNAL MEDICINE

## 2024-10-19 PROCEDURE — 36415 COLL VENOUS BLD VENIPUNCTURE: CPT | Performed by: INTERNAL MEDICINE

## 2024-10-19 RX ORDER — BICTEGRAVIR SODIUM, EMTRICITABINE, AND TENOFOVIR ALAFENAMIDE FUMARATE 50; 200; 25 MG/1; MG/1; MG/1
1 TABLET ORAL DAILY
Qty: 30 TABLET | Refills: 1 | Status: SHIPPED | OUTPATIENT
Start: 2024-10-19

## 2024-10-19 RX ADMIN — BICTEGRAVIR SODIUM, EMTRICITABINE, AND TENOFOVIR ALAFENAMIDE FUMARATE 1 TABLET: 50; 200; 25 TABLET ORAL at 11:10

## 2024-10-19 NOTE — ED PROVIDER NOTES
Encounter Date: 10/19/2024       History     Chief Complaint   Patient presents with    Facial Injury     Patient reports to the ER after blacking out and waking up to a black eye. Also states he would like to detox off of meth and mojo. Last reports using prior to blacking out tonight.      Presents with syncope. States Hx of HIV, taking his medications. Was walking and woke up in the street with some facial contusion. Admits using methamphetamines and marijuana, including synthetic, and will like help with rehabilitation.    The history is provided by the patient.     Review of patient's allergies indicates:  No Known Allergies  Past Medical History:   Diagnosis Date    Depression     Immune deficiency disorder      History reviewed. No pertinent surgical history.  No family history on file.  Social History     Tobacco Use    Smoking status: Never    Smokeless tobacco: Never   Substance Use Topics    Alcohol use: Not Currently    Drug use: Not Currently     Review of Systems   Skin:  Positive for wound.   Neurological:  Positive for syncope.   Psychiatric/Behavioral:  Positive for confusion.        Physical Exam     Initial Vitals [10/19/24 0608]   BP Pulse Resp Temp SpO2   102/70 100 18 96.8 °F (36 °C) 97 %      MAP       --         Physical Exam    Nursing note and vitals reviewed.  Constitutional: He appears well-developed and well-nourished. No distress.   HENT:   Head: Normocephalic and atraumatic.   Right Ear: External ear normal.   Left Ear: External ear normal. Mouth/Throat: Oropharynx is clear and moist. No oropharyngeal exudate.   Left cheek laceration, 1.5 cm, mild bleeding. Associated facial contusion and bilateral periorbital ecchymosis and left nares dry blood (Epistaxis?)  Normal oropharynx   Eyes: Conjunctivae and EOM are normal. Pupils are equal, round, and reactive to light.   Neck: Neck supple. No JVD present.   No C Spine tenderness   Normal range of motion.  Cardiovascular:  Normal rate,  regular rhythm, normal heart sounds and intact distal pulses.           Pulmonary/Chest: Breath sounds normal. No respiratory distress. He exhibits no tenderness.   Abdominal: Abdomen is soft. Bowel sounds are normal. He exhibits no distension. There is no abdominal tenderness. There is no rebound and no guarding.   Musculoskeletal:         General: No edema. Normal range of motion.      Cervical back: Normal range of motion and neck supple.     Neurological: He is alert and oriented to person, place, and time. He has normal strength. GCS score is 15. GCS eye subscore is 4. GCS verbal subscore is 5. GCS motor subscore is 6.   Skin: Skin is warm. No rash noted.   Left cheek laceration, 1.5 cm, mild bleeding.   Psychiatric: Thought content normal.         ED Course   Lac Repair    Date/Time: 10/19/2024 7:32 AM    Performed by: Jeronimo Turner MD  Authorized by: Jeronimo Turner MD    Consent:     Consent obtained:  Verbal    Consent given by:  Patient    Risks discussed:  Infection, poor cosmetic result, pain and poor wound healing    Alternatives discussed:  No treatment and delayed treatment  Universal protocol:     Procedure explained and questions answered to patient or proxy's satisfaction: yes      Relevant documents present and verified: yes      Test results available: yes      Imaging studies available: yes      Required blood products, implants, devices, and special equipment available: yes      Site/side marked: no      Immediately prior to procedure, a time out was called: yes      Patient identity confirmed:  Verbally with patient  Anesthesia:     Anesthesia method:  None  Laceration details:     Location:  Face    Face location:  L cheek    Length (cm):  1.5    Depth (mm):  3  Pre-procedure details:     Preparation:  Patient was prepped and draped in usual sterile fashion  Exploration:     Limited defect created (wound extended): no      Hemostasis achieved with:  Direct  pressure    Wound exploration: wound explored through full range of motion and entire depth of wound visualized      Wound extent: no foreign bodies/material noted, no tendon damage noted, no underlying fracture noted and no vascular damage noted      Contaminated: no    Treatment:     Area cleansed with:  Saline    Amount of cleaning:  Standard    Irrigation solution:  Sterile saline    Irrigation volume:  500    Irrigation method:  Pressure wash    Debridement:  None    Undermining:  None    Scar revision: no    Skin repair:     Repair method:  Steri-Strips    Number of Steri-Strips:  2  Approximation:     Approximation:  Close  Repair type:     Repair type:  Simple  Post-procedure details:     Dressing:  Adhesive bandage    Procedure completion:  Tolerated well, no immediate complications    Labs Reviewed   DRUG SCREEN, URINE (BEAKER) - Abnormal       Result Value    Amphetamines, Urine Positive (*)     Barbiturates, Urine Negative      Benzodiazepine, Urine Negative      Cannabinoids, Urine Negative      Cocaine, Urine Negative      Fentanyl, Urine Negative      MDMA, Urine Negative      Opiates, Urine Negative      Phencyclidine, Urine Negative      pH, Urine 5.5      Specific Gravity, Urine Auto 1.035      Narrative:     Cut off concentrations:    Amphetamines - 1000 ng/ml  Barbiturates - 200 ng/ml  Benzodiazepine - 200 ng/ml  Cannabinoids (THC) - 50 ng/ml  Cocaine - 300 ng/ml  Fentanyl - 1.0 ng/ml  MDMA - 500 ng/ml  Opiates - 300 ng/ml   Phencyclidine (PCP) - 25 ng/ml    Specimen submitted for drug analysis and tested for pH and specific gravity in order to evaluate sample integrity. Suspect tampering if specific gravity is <1.003 and/or pH is not within the range of 4.5 - 8.0  False negatives may result form substances such as bleach added to urine.  False positives may result for the presence of a substance with similar chemical structure to the drug or its metabolite.    This test provides only a  PRELIMINARY analytical test result. A more specific alternate chemical method must be used in order to obtain a confirmed analytical result. Gas chromatography/mass spectrometry (GC/MS) is the preferred confirmatory method. Other chemical confirmation methods are available. Clinical consideration and professional judgement should be applied to any drug of abuse test result, particularly when preliminary positive results are used.    Positive results will be confirmed only at the physicians request. Unconfirmed screening results are to be used only for medical purposes (treatment).        CBC WITH DIFFERENTIAL - Abnormal    WBC 11.21      RBC 5.39      Hgb 16.8      Hct 47.7      MCV 88.5      MCH 31.2 (*)     MCHC 35.2      RDW 11.9      Platelet 343      MPV 9.9      Neut % 75.0      Lymph % 15.6      Mono % 8.2      Eos % 0.5      Basophil % 0.3      Lymph # 1.75      Neut # 8.41      Mono # 0.92      Eos # 0.06      Baso # 0.03      IG# 0.04      IG% 0.4      NRBC% 0.0     ALCOHOL,MEDICAL (ETHANOL) - Normal    Ethanol Level <10.0     CBC W/ AUTO DIFFERENTIAL    Narrative:     The following orders were created for panel order CBC auto differential.  Procedure                               Abnormality         Status                     ---------                               -----------         ------                     CBC with Differential[[id number]]       Abnormal            Final result                 Please view results for these tests on the individual orders.   COMPREHENSIVE METABOLIC PANEL    Sodium 138      Potassium 4.3      Chloride 104      CO2 26      Glucose 88      Blood Urea Nitrogen 15.7      Creatinine 1.24      Calcium 9.9      Protein Total 7.5      Albumin 4.4      Globulin 3.1      Albumin/Globulin Ratio 1.4      Bilirubin Total 1.0      ALP 70      ALT 19      AST 21      eGFR >60      Anion Gap 8.0      BUN/Creatinine Ratio 13     EXTRA TUBES    Narrative:     The following orders were  created for panel order EXTRA TUBES.  Procedure                               Abnormality         Status                     ---------                               -----------         ------                     Lavender Top Hold[[id number]]                               Final result                 Please view results for these tests on the individual orders.   LAVENDER TOP HOLD    Extra Tube Hold for add-ons.       EKG Readings: (Independently Interpreted)   Initial Reading: No STEMI. Rhythm: Normal Sinus Rhythm. Heart Rate: 76. Ectopy: No Ectopy. Conduction: Normal (IRBBB). ST Segments: Normal ST Segments. T Waves: Normal. Axis: Right Axis Deviation. Clinical Impression: Normal Sinus Rhythm Other Impression: Left posterior fascicular block     ECG Results              EKG 12-lead (In process)        Collection Time Result Time QRS Duration OHS QTC Calculation    10/19/24 06:38:00 10/19/24 09:27:14 90 420                     In process by Interface, Lab In [facility] (10/19/24 09:27:21)                   Narrative:    Test Reason : R55,    Vent. Rate : 076 BPM     Atrial Rate : 076 BPM     P-R Int : 160 ms          QRS Dur : 090 ms      QT Int : 374 ms       P-R-T Axes : 067 111 059 degrees     QTc Int : 420 ms    Normal sinus rhythm with sinus arrhythmia  Right atrial enlargement  Left posterior fascicular block  Abnormal ECG  No previous ECGs available    Referred By: [email or web address]   SELF           Confirmed By:                                   Imaging Results              CT Maxillofacial Without Contrast (Final result)  Result time 10/19/24 09:19:59      Final result by Clint Story MD (10/19/24 09:19:59)                   Impression:      1. Left periorbital and facial soft inflammation with emphysema    2. Left intraorbital emphysema with mild fracture of the left orbital floor.    3. Details of the findings above.      Electronically signed by: Clint Story  Date:    10/19/2024  Time:    09:19                Narrative:    EXAMINATION:  CT MAXILLOFACIAL WITHOUT CONTRAST    CLINICAL HISTORY:  Facial trauma, blunt;    TECHNIQUE:  Multidetector axial images were performed maxillofacial without contrast and images reformatted.    Dose length product of 257 mGycm. Automated exposure control was utilized to minimize radiation dose.    COMPARISON:  None available    FINDINGS:  There are left periorbital and infraorbital facial soft tissue inflammations with emphysema.  There is mild fracture deformity along the floor of left orbit.  This results and mild left intraorbital emphysema.  No intraorbital significant inflammation or hematomas.  The globes are unremarkable    There are no fractures of the nasal bones, pterygoids, zygomatic arches, paranasal sinuses walls or the mandibles.                                       CT Head Without Contrast (Final result)  Result time 10/19/24 09:15:35      Final result by Clint Story MD (10/19/24 09:15:35)                   Impression:      No acute intracranial findings identified.      Electronically signed by: Clint Story  Date:    10/19/2024  Time:    09:15               Narrative:    EXAMINATION:  CT HEAD WITHOUT CONTRAST    CLINICAL HISTORY:  Syncope, recurrent;    TECHNIQUE:  Sequential axial images were performed of the brain without contrast.    Dose product length of 955 mGycm. Automated exposure control was utilized to minimize radiation dose.    COMPARISON:  None available.    FINDINGS:  Gray-white matter differentiation is unremarkable there is no intracranial mass effect, midline shift, hydrocephalus or hemorrhage. There is no sulcal effacement or low attenuation changes to suggest recent large vessel territory infarction. There is no acute extra axial fluid collection.  There are posterior fossa atypical appearance of arachnoid cyst versus donald cisterna magna.  There is no acute depressed calvarial fracture.    Maxillofacial findings are described on a separate  report.                                       Medications - No data to display  Medical Decision Making  Patient with history substance abuse, fell with syncope while walking sustaining blunt facial injury causing minor laceration and minor left orbit inferior floor fracture.  Stable for outpatient management, consulted ENT for clinic follow-up and [facility] for substance abuse treatment.    Problems Addressed:  Closed blow-out fracture of left orbit, initial encounter: acute illness or injury  Polysubstance abuse: chronic illness or injury with exacerbation, progression, or side effects of treatment  Syncope: acute illness or injury    Amount and/or Complexity of Data Reviewed  Labs: ordered. Decision-making details documented in ED Course.  Radiology: ordered and independent interpretation performed. Decision-making details documented in ED Course.  ECG/medicine tests: ordered and independent interpretation performed. Decision-making details documented in ED Course.    Risk  OTC drugs.  Decision regarding hospitalization.      Additional MDM:   Differential Diagnosis:   Drug overdose, hypoglycemia, stroke, encephalopathy, medication side effects, facial trauma among others                   [place] CT Rule    Exclusion Criteria: Age < 17 y/o; Use of Blood Thinners; Seizure After Injury    GCS < 15 at 2 hours post injury  Suspected open or depress skull Fx  Signs of Basilar Skull Fx  > 2 episodes of vomiting  Age > 63 y/o  Retrograde amnesia to the event > 30 minutes  Dangerous mechanism of injury (Fall > 3 feet, Pedestrian-MVC > 5 mph; Ejection)      Any Criteria Present   X Yes, due to 2 points CT is recommended               [place] CT Rule      Headache  Vomiting  Age > 60  Alcohol or Drug intoxication  Persistent anterograde amnesia  Visible trauma above the clavicle  Seizures    Any Criteria Present   X Yes, due to 3 points CT is recommended                    7:39 AM I assumed care at this time,  case discussed with Dr. Tobar in detail, patient interviewed and examined, data reviewed.  Underlying history of HIV and polysubstance abuse predominantly stimulants, has been through rehab before but relapsed.  Was walking when he woke up on the floor, uncertain mechanism, syncopal event with facial blunt trauma and minor left cheek laceration as described.  Awaiting laboratory data, CT scans, expecting outpatient rehab.  Monitoring closely.    Tevin George MD    8:42 AM Lab data unremarkable, exam stable, awaiting CT results.    Tevin George MD      9:50 AM Resting comfortably, vital signs stable, alert, clinically sober, tolerating injuries well.  Extraocular eye movements are intact, no evidence of entrapment, vision is normal, no sign of ocular trauma.  Minor nondisplaced left inferior floor orbital fracture noted, discussed with ENT on-call - Dr. Harris, appropriate for routine sinus precautions in clinic follow-up.  He is cleared for outpatient substance abuse treatment with the assistance of the [facility] representative. Counseled in detail.    Tevin George MD      Clinical Impression:  Final diagnoses:  [R55] Syncope  [F19.10] Polysubstance abuse (Primary)  [S01.81XA] Facial laceration, initial encounter  [R55] Syncope, unspecified syncope type  [B20] Currently asymptomatic HIV infection, with history of HIV-related illness  [S02.32XA] Closed blow-out fracture of left orbit, initial encounter          ED Disposition Condition    Discharge Stable          ED Prescriptions    None       Follow-up Information       Follow up With Specialties Details Why Contact Info    [facility] Emergency Medicine  As needed [address] [facility] [address] [phone]             Tevin George MD  10/19/24 0982

## 2024-10-19 NOTE — DISCHARGE INSTRUCTIONS
As discussed, you have a minor fracture in the floor of the left orbit.  The ENT clinic will call you to schedule a clinic follow-up appointment.  In the meantime, for the next 2-3 weeks do not blow your nose, suck on a straw, or do any other maneuvers that would cause increased or decreased pressure in the mouth or nose.  Tylenol plus Motrin as labeled as needed for pain.      Stop using drugs and go to drug treatment as per the [facility] representative.

## 2024-10-22 LAB
OHS QRS DURATION: 90 MS
OHS QTC CALCULATION: 420 MS